# Patient Record
Sex: MALE | Race: WHITE | Employment: PART TIME | URBAN - METROPOLITAN AREA
[De-identification: names, ages, dates, MRNs, and addresses within clinical notes are randomized per-mention and may not be internally consistent; named-entity substitution may affect disease eponyms.]

---

## 2017-01-03 ENCOUNTER — OFFICE VISIT (OUTPATIENT)
Dept: ORTHOPEDICS | Facility: CLINIC | Age: 52
End: 2017-01-03
Payer: COMMERCIAL

## 2017-01-03 ENCOUNTER — RADIANT APPOINTMENT (OUTPATIENT)
Dept: GENERAL RADIOLOGY | Facility: CLINIC | Age: 52
End: 2017-01-03
Attending: ORTHOPAEDIC SURGERY
Payer: COMMERCIAL

## 2017-01-03 VITALS — TEMPERATURE: 98.2 F | HEART RATE: 95 BPM | DIASTOLIC BLOOD PRESSURE: 98 MMHG | SYSTOLIC BLOOD PRESSURE: 145 MMHG

## 2017-01-03 DIAGNOSIS — Z47.89 ORTHOPEDIC AFTERCARE: ICD-10-CM

## 2017-01-03 DIAGNOSIS — Z47.89 ORTHOPEDIC AFTERCARE: Primary | ICD-10-CM

## 2017-01-03 PROCEDURE — 99213 OFFICE O/P EST LOW 20 MIN: CPT | Performed by: ORTHOPAEDIC SURGERY

## 2017-01-03 PROCEDURE — 73562 X-RAY EXAM OF KNEE 3: CPT | Mod: RT | Performed by: RADIOLOGY

## 2017-01-03 ASSESSMENT — PAIN SCALES - GENERAL: PAINLEVEL: MODERATE PAIN (4)

## 2017-01-03 NOTE — PROGRESS NOTES
Shadi returns today for his right knee. He reports that he has been having some soreness along the lateral aspect of the knee -- the right knee is s/p TKA-- and questions if this is associated with swelling.   On physical examination the knee is benign in appearance and there is no effusion. He is not TTP at the medial or lateral joint lines. The collateral ligaments are intact as is the PCL. There is no mid-flexion instability. Knee ROM is fluid and painless and there is full extension and greater than 110 of flexion. His gait is normal. There is a small superificial prominence that is benign and compressible and seems consistent with a large superficial vein.   We reviewed the radiographs together. These show the normal progression for a total knee arthroplasty without evidence of loosening or subsidence.   Assessment: normal knee. Suspect he may have over done it a little over the holidays and as the exam is benign and his symptoms are mild and resolving it is appropriate to observe for now. He is going to return to clinic if the symptoms do not continue to improve or if not resolved in a few weeks time.     Twenty minutes were spent with the patient with greater than 50% on counseling and coordination of care.

## 2017-01-03 NOTE — NURSING NOTE
"Jeronimo Langston's goals for this visit include: RTKA 8-15-16; knee swelling, Friday - noticed a 'lump' on the right side of knee  He requests these members of his care team be copied on today's visit information: yes    PCP: Lexi Jha    Referring Provider:  No referring provider defined for this encounter.    Chief Complaint   Patient presents with     Surgical Followup     RTKA 8-15-16; knee swelling, Friday - noticed a 'lump' on the right side of knee       Initial /98 mmHg  Pulse 95  Temp(Src) 98.2  F (36.8  C) (Oral) Estimated body mass index is 32.15 kg/(m^2) as calculated from the following:    Height as of 9/6/16: 1.753 m (5' 9\").    Weight as of 8/15/16: 98.8 kg (217 lb 13 oz).  BP completed using cuff size: large    "

## 2017-01-03 NOTE — PATIENT INSTRUCTIONS
Thanks for coming today.  Ortho/Sports Medicine Clinic  [address] [place] [address]    To schedule future appointments in Ortho Clinic, you may call [phone].    To schedule ordered imaging by your Provider: Call [facility] at [phone]    Xumii available online at:   [email or web address]    Please call if any further questions or concerns [phone] and ask for the Orthopedic Department. Clinic hours 8 am to 5 pm.    Return to clinic if symptoms worsen.

## 2017-02-24 DIAGNOSIS — Z96.651 STATUS POST TOTAL RIGHT KNEE REPLACEMENT: Primary | ICD-10-CM

## 2017-02-28 ENCOUNTER — RADIANT APPOINTMENT (OUTPATIENT)
Dept: GENERAL RADIOLOGY | Facility: CLINIC | Age: 52
End: 2017-02-28
Attending: ORTHOPAEDIC SURGERY
Payer: COMMERCIAL

## 2017-02-28 ENCOUNTER — OFFICE VISIT (OUTPATIENT)
Dept: ORTHOPEDICS | Facility: CLINIC | Age: 52
End: 2017-02-28
Payer: COMMERCIAL

## 2017-02-28 VITALS — SYSTOLIC BLOOD PRESSURE: 138 MMHG | DIASTOLIC BLOOD PRESSURE: 91 MMHG | HEART RATE: 95 BPM

## 2017-02-28 DIAGNOSIS — Z96.651 STATUS POST TOTAL RIGHT KNEE REPLACEMENT: ICD-10-CM

## 2017-02-28 DIAGNOSIS — M25.561 RIGHT KNEE PAIN, UNSPECIFIED CHRONICITY: Primary | ICD-10-CM

## 2017-02-28 PROCEDURE — 73560 X-RAY EXAM OF KNEE 1 OR 2: CPT | Mod: RT | Performed by: RADIOLOGY

## 2017-02-28 PROCEDURE — 99213 OFFICE O/P EST LOW 20 MIN: CPT | Performed by: ORTHOPAEDIC SURGERY

## 2017-02-28 RX ORDER — DIAZEPAM 5 MG
TABLET ORAL
Refills: 3 | Status: ON HOLD | COMMUNITY
Start: 2017-02-21 | End: 2019-02-25

## 2017-02-28 NOTE — MR AVS SNAPSHOT
After Visit Summary   2017    Jeronimo Langston    MRN: [id number]           Patient Information     Date Of Birth          1965        Visit Information        Provider Department      2017 9:15 AM Agustín Freire MD [facility]        Today's Diagnoses     Right knee pain, unspecified chronicity    -  1      Care Instructions    Thanks for coming today.  Ortho/Sports Medicine Clinic  [address] [place] [address]    To schedule future appointments in Ortho Clinic, you may call [phone].    To schedule ordered imaging by your provider:   Call [facility] [phone]    To schedule an injection ordered by your provider:  Call [facility] Injection scheduling line: [phone]  YEDInstitute available online at:  [email or web address]    Please call if any further questions or concerns ([phone]).  Clinic hours 8 am to 5 pm.    Return to clinic (call) if symptoms worsen or fail to improve.          Follow-ups after your visit        Who to contact     If you have questions or need follow up information about today's clinic visit or your schedule please contact [facility] directly at [phone].  Normal or non-critical lab and imaging results will be communicated to you by MyChart, letter or phone within 4 business days after the clinic has received the results. If you do not hear from us within 7 days, please contact the clinic through [email or web address] or phone. If you have a critical or abnormal lab result, we will notify you by phone as soon as possible.  Submit refill requests through YEDInstitute or call your pharmacy and they will forward the refill request to us. Please allow 3 business days for your refill to be completed.          Additional Information About Your Visit        MyChart Information     YEDInstitute is an electronic gateway that provides easy, online access to your medical records. With YEDInstitute, you can  request a clinic appointment, read your test results, renew a prescription or communicate with your care team.     To sign up for Otometrix Medical Technologiest visit the website at [email or web address]   You will be asked to enter the access code listed below, as well as some personal information. Please follow the directions to create your username and password.     Your access code is: [email or web address]  Expires: 2017  9:47 AM     Your access code will  in 90 days. If you need help or a new code, please contact your [facility] or call [phone] for assistance.        Care EveryWhere ID     This is your Care EveryWhere ID. This could be used by other organizations to access your [place] medical records  [id number]        Your Vitals Were     Pulse                   95            Blood Pressure from Last 3 Encounters:   17 (!) 138/91   17 (!) 145/98   10/18/16 143/90    Weight from Last 3 Encounters:   08/15/16 98.8 kg (217 lb 13 oz)   16 102.5 kg (226 lb)   16 99.8 kg (220 lb)              Today, you had the following     No orders found for display       Primary Care Provider    None       No address on file        Thank you!     Thank you for choosing [facility]  for your care. Our goal is always to provide you with excellent care. Hearing back from our patients is one way we can continue to improve our services. Please take a few minutes to complete the written survey that you may receive in the mail after your visit with us. Thank you!             Your Updated Medication List - Protect others around you: Learn how to safely use, store and throw away your medicines at www.disposemymeds.org.          This list is accurate as of: 17  9:47 AM.  Always use your most recent med list.                   Brand Name Dispense Instructions for use    AMBIEN PO          ASPIRIN PO      Take 81 mg by mouth daily       diazepam 5 MG tablet    VALIUM      TK 1 T PO TID PRN       DULOXETINE HCL PO      Take 60 mg by mouth       gabapentin 300 MG capsule    NEURONTIN     Take 600 mg by mouth 2 times daily       magnesium 100 MG Caps          Multi-vitamin Tabs tablet   Generic drug:  multivitamin, therapeutic with minerals          OMEGA-3 FISH OIL PO      Take by mouth daily

## 2017-02-28 NOTE — PATIENT INSTRUCTIONS
Thanks for coming today.  Ortho/Sports Medicine Clinic  [address] [place] [address]    To schedule future appointments in Ortho Clinic, you may call [phone].    To schedule ordered imaging by your provider:   Call [facility] [phone]    To schedule an injection ordered by your provider:  Call [facility] Injection scheduling line: [phone]  FotoSwipehart available online at:  [email or web address]    Please call if any further questions or concerns ([phone]).  Clinic hours 8 am to 5 pm.    Return to clinic (call) if symptoms worsen or fail to improve.

## 2017-02-28 NOTE — NURSING NOTE
"Jeronimo Langston's goals for this visit include: 6mo follow up RTKA, pain when walking and doing stairs  He requests these members of his care team be copied on today's visit information: yes    PCP: None    Referring Provider:  No referring provider defined for this encounter.    Chief Complaint   Patient presents with     RECHECK     6mo RTKA       Initial BP (!) 138/91  Pulse 95 Estimated body mass index is 32.17 kg/(m^2) as calculated from the following:    Height as of 8/15/16: 1.753 m (5' 9\").    Weight as of 8/15/16: 98.8 kg (217 lb 13 oz).  Medication Reconciliation: complete   Nancy David RN      "

## 2017-02-28 NOTE — PROGRESS NOTES
Chief Complaint: RECHECK (6mo RTKA)   right knee anterior symptoms from the distal thigh across the anterior knee to proximal tibia     HPI: Jeronimo Langston returns today in follow-up for the above. He reports that he is in the process of determining if his symptoms are associated with his long standing spine issues or from the knee. He reports an area at the lateral joint line that is quite point tender and that this does not reproduce the entire pain pattern when he presses on it. He is undergoing a lumbar rhizotomy this week to further this work-up. He also reports medial thigh pain.    Medications and allergies are documented in the EMR and have been reviewed.      Current Outpatient Prescriptions:      ASPIRIN PO, Take 81 mg by mouth daily, Disp: , Rfl:      diazepam (VALIUM) 5 MG tablet, TK 1 T PO TID PRN, Disp: , Rfl: 3     DULOXETINE HCL PO, Take 60 mg by mouth , Disp: , Rfl:      magnesium 100 MG CAPS, , Disp: , Rfl:      Zolpidem Tartrate (AMBIEN PO), , Disp: , Rfl:      Omega-3 Fatty Acids (OMEGA-3 FISH OIL PO), Take by mouth daily, Disp: , Rfl:      gabapentin (NEURONTIN) 300 MG capsule, Take 600 mg by mouth 2 times daily , Disp: , Rfl:      MULTI-VITAMIN OR TABS, , Disp: , Rfl: 0    Allergies: Oxycodone; No known drug allergies; and Lovastatin    Physical Exam:  On physical examination the patient appears the stated age, is in no acute distress, affect is appropriate, and breathing is non-labored.    BP (!) 138/91  Pulse 95  There is no height or weight on file to calculate BMI.    Gait: mild limp on the right   ROM:  Knee ROM is fluid and painless with 0-115 of flexion. He is well balanced in the coronal and the sagital planes and I can't make the lateral knee hurt even with maximum varus force. He is point tender at the lateral > medial joint lines. It does not seem to reproduce his pain pattern but it is sore. The knee is benign in appearance. There is no redness and no swelling.     Distally, the  circulatory, motor, and sensation exam is intact with 5/5 EHL, gastroc-soleus, and tibialis anterior.  Sensation to light touch is intact.  Dorsalis pedis and posterior tibialis pulses are palpable.  There are no sores on the feet, no bruising, and no lymphedema.    X-rays:    I reviewed the x-rays dated today.  Previous films reviewed.    Findings:  Normal progression for a total knee arthroplasty without evidence of loosening or subsidence. He has 5 degrees of posterior slope.     Assessment: question two separate issues with lateral joint line tenderness that seems to be from his knee and we discussed a more superificial injection at this level. I have suggested, however, not to do this until after the effects of the rhizotomy are clear.     Plan: he is going to [facility] in 6 weeks for a lateral soft-tissue injection.     No ref. provider found

## 2017-04-17 ENCOUNTER — DOCUMENTATION ONLY (OUTPATIENT)
Dept: ORTHOPEDICS | Facility: CLINIC | Age: 52
End: 2017-04-17

## 2017-04-17 NOTE — PROGRESS NOTES
Date Received:  4-17-17  Form Type: [employer] statement form  Doctor:  ESTHELA Freire  Status: Working on  Special Instructions:  The atty office is also requesting a narrative report at this time as well  Colleen Seo, Admin Cooord. II, Orthopaedics    4/18/17 Per Chepe, Nurse for Dr Freire, Dr Freire stated for patient to get a FCE to complete the [employer] statement form.  Colleen Seo, Admin Cooord. II, Orthopaedics

## 2017-05-02 ENCOUNTER — OFFICE VISIT (OUTPATIENT)
Dept: ORTHOPEDICS | Facility: CLINIC | Age: 52
End: 2017-05-02
Payer: COMMERCIAL

## 2017-05-02 VITALS — SYSTOLIC BLOOD PRESSURE: 132 MMHG | OXYGEN SATURATION: 93 % | DIASTOLIC BLOOD PRESSURE: 89 MMHG | HEART RATE: 91 BPM

## 2017-05-02 DIAGNOSIS — M25.561 RIGHT KNEE PAIN, UNSPECIFIED CHRONICITY: Primary | ICD-10-CM

## 2017-05-02 LAB
CRP SERPL-MCNC: 3.4 MG/L (ref 0–8)
ERYTHROCYTE [SEDIMENTATION RATE] IN BLOOD BY WESTERGREN METHOD: 6 MM/H (ref 0–20)

## 2017-05-02 PROCEDURE — 86140 C-REACTIVE PROTEIN: CPT | Performed by: ORTHOPAEDIC SURGERY

## 2017-05-02 PROCEDURE — 99213 OFFICE O/P EST LOW 20 MIN: CPT | Performed by: ORTHOPAEDIC SURGERY

## 2017-05-02 PROCEDURE — 85652 RBC SED RATE AUTOMATED: CPT | Performed by: ORTHOPAEDIC SURGERY

## 2017-05-02 PROCEDURE — 36415 COLL VENOUS BLD VENIPUNCTURE: CPT | Performed by: ORTHOPAEDIC SURGERY

## 2017-05-02 ASSESSMENT — PAIN SCALES - GENERAL: PAINLEVEL: SEVERE PAIN (7)

## 2017-05-02 NOTE — PATIENT INSTRUCTIONS
Thanks for coming today.  Ortho/Sports Medicine Clinic  [address] [place] [address]    To schedule future appointments in Ortho Clinic, you may call [phone].    To schedule ordered imaging by your provider:   Call [facility] [phone]    To schedule an injection ordered by your provider:  Call [facility] Injection scheduling line: [phone]  Icelandic Glacialhart available online at:  [email or web address]    Please call if any further questions or concerns ([phone]).  Clinic hours 8 am to 5 pm.    Return to clinic (call) if symptoms worsen or fail to improve.

## 2017-05-02 NOTE — MR AVS SNAPSHOT
After Visit Summary   2017    Jeronimo Langston    MRN: [id number]           Patient Information     Date Of Birth          1965        Visit Information        Provider Department      2017 9:10 AM Agustín Freire MD [facility]        Today's Diagnoses     Right knee pain, unspecified chronicity    -  1      Care Instructions    Thanks for coming today.  Ortho/Sports Medicine Clinic  [address] [place] [address]    To schedule future appointments in Ortho Clinic, you may call [phone].    To schedule ordered imaging by your provider:   Call [facility] [phone]    To schedule an injection ordered by your provider:  Call [facility] Injection scheduling line: [phone]  Ozmota available online at:  [email or web address]    Please call if any further questions or concerns ([phone]).  Clinic hours 8 am to 5 pm.    Return to clinic (call) if symptoms worsen or fail to improve.        Follow-ups after your visit        Who to contact     If you have questions or need follow up information about today's clinic visit or your schedule please contact [facility] directly at [phone].  Normal or non-critical lab and imaging results will be communicated to you by MyChart, letter or phone within 4 business days after the clinic has received the results. If you do not hear from us within 7 days, please contact the clinic through Triggithart or phone. If you have a critical or abnormal lab result, we will notify you by phone as soon as possible.  Submit refill requests through Ozmota or call your pharmacy and they will forward the refill request to us. Please allow 3 business days for your refill to be completed.          Additional Information About Your Visit        MyChart Information     Ozmota is an electronic gateway that provides easy, online access to your medical records. With Ozmota, you can  request a clinic appointment, read your test results, renew a prescription or communicate with your care team.     To sign up for xPeerientt visit the website at [email or web address]   You will be asked to enter the access code listed below, as well as some personal information. Please follow the directions to create your username and password.     Your access code is: [email or web address]  Expires: 2017 10:47 AM     Your access code will  in 90 days. If you need help or a new code, please contact your [facility] or call [phone] for assistance.        Care EveryWhere ID     This is your Care EveryWhere ID. This could be used by other organizations to access your [place] medical records  [id number]        Your Vitals Were     Pulse Pulse Oximetry                91 93%           Blood Pressure from Last 3 Encounters:   17 132/89   17 (!) 138/91   17 (!) 145/98    Weight from Last 3 Encounters:   08/15/16 98.8 kg (217 lb 13 oz)   16 102.5 kg (226 lb)   16 99.8 kg (220 lb)              We Performed the Following     CRP inflammation     Erythrocyte sedimentation rate auto        Primary Care Provider    None       No address on file        Thank you!     Thank you for choosing [facility]  for your care. Our goal is always to provide you with excellent care. Hearing back from our patients is one way we can continue to improve our services. Please take a few minutes to complete the written survey that you may receive in the mail after your visit with us. Thank you!             Your Updated Medication List - Protect others around you: Learn how to safely use, store and throw away your medicines at www.disposemymeds.org.          This list is accurate as of: 17  9:26 AM.  Always use your most recent med list.                   Brand Name Dispense Instructions for use    AMBIEN PO          ASPIRIN PO      Take 81 mg by mouth  daily       diazepam 5 MG tablet    VALIUM     TK 1 T PO TID PRN       DULOXETINE HCL PO      Take 60 mg by mouth       gabapentin 300 MG capsule    NEURONTIN     Take 600 mg by mouth 2 times daily       magnesium 100 MG Caps          Multi-vitamin Tabs tablet   Generic drug:  multivitamin, therapeutic with minerals          OMEGA-3 FISH OIL PO      Take by mouth daily

## 2017-05-02 NOTE — PROGRESS NOTES
Jeronimo returns today for his knee and his back. He had the rhizotomy and unfortunately this made his symptoms significantly worse and he is in the process of having a epidural scheduled to address this.     On physical examination he walks with a very mild antalgic gait favoring his right. The knee examination is unchanged.     Assessment and Plan: what appears to be symptoms secondary to point tenderness just distal to the lateral joint line. I have suggested that we get a SED/CRP to make sure its not a lingering infection and then injection that area. Spine care per his outside spine team.

## 2017-05-16 ENCOUNTER — OFFICE VISIT (OUTPATIENT)
Dept: ORTHOPEDICS | Facility: CLINIC | Age: 52
End: 2017-05-16
Payer: COMMERCIAL

## 2017-05-16 VITALS — DIASTOLIC BLOOD PRESSURE: 89 MMHG | SYSTOLIC BLOOD PRESSURE: 145 MMHG | HEART RATE: 92 BPM

## 2017-05-16 DIAGNOSIS — M25.561 CHRONIC PAIN OF RIGHT KNEE: Primary | ICD-10-CM

## 2017-05-16 DIAGNOSIS — G89.29 CHRONIC PAIN OF RIGHT KNEE: Primary | ICD-10-CM

## 2017-05-16 PROCEDURE — 20610 DRAIN/INJ JOINT/BURSA W/O US: CPT | Mod: RT | Performed by: ORTHOPAEDIC SURGERY

## 2017-05-16 PROCEDURE — 99207 ZZC NO CHARGE LOS: CPT | Performed by: ORTHOPAEDIC SURGERY

## 2017-05-16 NOTE — PATIENT INSTRUCTIONS
Thanks for coming today.  Ortho/Sports Medicine Clinic  [address] [place] [address]    To schedule future appointments in Ortho Clinic, you may call [phone].    To schedule ordered imaging by your provider:   Call [facility] [phone]    To schedule an injection ordered by your provider:  Call [facility] Injection scheduling line: [phone]  Mind The Placehart available online at:  [email or web address]    Please call if any further questions or concerns ([phone]).  Clinic hours 8 am to 5 pm.    Return to clinic (call) if symptoms worsen or fail to improve.

## 2017-05-16 NOTE — PROGRESS NOTES
Jeronimo returns today for his right knee. He reports there has been no change in his symptoms. We reviewed his lab results. These are normal and show no concern for infection.      On examination the knee is benign in appearance. There is no swelling, redness, or induration and extension is full and comfortable.    Procedure Note:    After informed consent was obtained the patient's right knee was injected with 5 cc of lidocaine and 40 mg of kenolog using a superior-medial approach.  Sterile technique was used.  The patient tolerated the procedure well.

## 2017-05-16 NOTE — NURSING NOTE
"Jeronimo Langston's goals for this visit include: Right knee injection  He requests these members of his care team be copied on today's visit information: yes    PCP: None    Referring Provider:  No referring provider defined for this encounter.    Chief Complaint   Patient presents with     RECHECK     Right knee injection/ lab results       Initial /89  Pulse 92 Estimated body mass index is 32.17 kg/(m^2) as calculated from the following:    Height as of 8/15/16: 1.753 m (5' 9\").    Weight as of 8/15/16: 98.8 kg (217 lb 13 oz).  Medication Reconciliation: complete  "

## 2017-05-16 NOTE — MR AVS SNAPSHOT
After Visit Summary   2017    Jeronimo Langston    MRN: [id number]           Patient Information     Date Of Birth          1965        Visit Information        Provider Department      2017 10:10 AM Agustín Freire MD [facility]        Today's Diagnoses     Chronic pain of right knee    -  1      Care Instructions    Thanks for coming today.  Ortho/Sports Medicine Clinic  [address] [place] [address]    To schedule future appointments in Ortho Clinic, you may call [phone].    To schedule ordered imaging by your provider:   Call [facility] [phone]    To schedule an injection ordered by your provider:  Call [facility] Injection scheduling line: [phone]  Vandas Group available online at:  [email or web address]    Please call if any further questions or concerns ([phone]).  Clinic hours 8 am to 5 pm.    Return to clinic (call) if symptoms worsen or fail to improve.        Follow-ups after your visit        Who to contact     If you have questions or need follow up information about today's clinic visit or your schedule please contact [facility] directly at [phone].  Normal or non-critical lab and imaging results will be communicated to you by SenSagehart, letter or phone within 4 business days after the clinic has received the results. If you do not hear from us within 7 days, please contact the clinic through SenSagehart or phone. If you have a critical or abnormal lab result, we will notify you by phone as soon as possible.  Submit refill requests through Vandas Group or call your pharmacy and they will forward the refill request to us. Please allow 3 business days for your refill to be completed.          Additional Information About Your Visit        MyCharDirect Flow Medical Information     Vandas Group is an electronic gateway that provides easy, online access to your medical records. With Vandas Group, you can request a clinic  appointment, read your test results, renew a prescription or communicate with your care team.     To sign up for ArtCorgihart visit the website at [email or web address]   You will be asked to enter the access code listed below, as well as some personal information. Please follow the directions to create your username and password.     Your access code is: [email or web address]  Expires: 2017 10:47 AM     Your access code will  in 90 days. If you need help or a new code, please contact your [facility] or call [phone] for assistance.        Care EveryWhere ID     This is your Care EveryWhere ID. This could be used by other organizations to access your [place] medical records  [id number]        Your Vitals Were     Pulse                   92            Blood Pressure from Last 3 Encounters:   17 145/89   17 132/89   17 (!) 138/91    Weight from Last 3 Encounters:   08/15/16 98.8 kg (217 lb 13 oz)   16 102.5 kg (226 lb)   16 99.8 kg (220 lb)              Today, you had the following     No orders found for display       Primary Care Provider    None       No address on file        Thank you!     Thank you for choosing [facility]  for your care. Our goal is always to provide you with excellent care. Hearing back from our patients is one way we can continue to improve our services. Please take a few minutes to complete the written survey that you may receive in the mail after your visit with us. Thank you!             Your Updated Medication List - Protect others around you: Learn how to safely use, store and throw away your medicines at www.disposemymeds.org.          This list is accurate as of: 17 10:44 AM.  Always use your most recent med list.                   Brand Name Dispense Instructions for use    AMBIEN PO          ASPIRIN PO      Take 81 mg by mouth daily       diazepam 5 MG tablet    VALIUM     TK 1 T PO TID PRN        DULOXETINE HCL PO      Take 60 mg by mouth       gabapentin 300 MG capsule    NEURONTIN     Take 600 mg by mouth 2 times daily       magnesium 100 MG Caps          Multi-vitamin Tabs tablet   Generic drug:  multivitamin, therapeutic with minerals          OMEGA-3 FISH OIL PO      Take by mouth daily

## 2017-12-12 ENCOUNTER — RADIANT APPOINTMENT (OUTPATIENT)
Dept: GENERAL RADIOLOGY | Facility: CLINIC | Age: 52
End: 2017-12-12
Attending: ORTHOPAEDIC SURGERY
Payer: COMMERCIAL

## 2017-12-12 ENCOUNTER — OFFICE VISIT (OUTPATIENT)
Dept: ORTHOPEDICS | Facility: CLINIC | Age: 52
End: 2017-12-12
Payer: COMMERCIAL

## 2017-12-12 VITALS
WEIGHT: 220 LBS | DIASTOLIC BLOOD PRESSURE: 96 MMHG | SYSTOLIC BLOOD PRESSURE: 149 MMHG | OXYGEN SATURATION: 96 % | HEART RATE: 103 BPM | BODY MASS INDEX: 32.49 KG/M2

## 2017-12-12 DIAGNOSIS — G89.29 CHRONIC PAIN OF RIGHT KNEE: ICD-10-CM

## 2017-12-12 DIAGNOSIS — M25.561 CHRONIC PAIN OF RIGHT KNEE: ICD-10-CM

## 2017-12-12 DIAGNOSIS — G89.29 CHRONIC PAIN OF RIGHT KNEE: Primary | ICD-10-CM

## 2017-12-12 DIAGNOSIS — M25.561 CHRONIC PAIN OF RIGHT KNEE: Primary | ICD-10-CM

## 2017-12-12 PROCEDURE — 99213 OFFICE O/P EST LOW 20 MIN: CPT | Performed by: ORTHOPAEDIC SURGERY

## 2017-12-12 PROCEDURE — 73562 X-RAY EXAM OF KNEE 3: CPT | Mod: RT | Performed by: RADIOLOGY

## 2017-12-12 ASSESSMENT — PAIN SCALES - GENERAL: PAINLEVEL: MODERATE PAIN (4)

## 2017-12-12 NOTE — NURSING NOTE
"Jeronimo Langston's goals for this visit include:   He requests these members of his care team be copied on today's visit information:     PCP: Clinic, [facility]    Referring Provider:  No referring provider defined for this encounter.    Chief Complaint   Patient presents with     RECHECK     right knee pain, actuaaly states both knees hurting       Initial BP (!) 149/96 (Patient Position: Sitting, Cuff Size: Adult Large)  Pulse 103  Wt 99.8 kg (220 lb)  SpO2 96%  BMI 32.49 kg/m2 Estimated body mass index is 32.49 kg/(m^2) as calculated from the following:    Height as of 9/6/16: 1.753 m (5' 9\").    Weight as of this encounter: 99.8 kg (220 lb).  Medication Reconciliation: complete    Do you need any medication refills at today's visit? No  Chief Complaint   Patient presents with     RECHECK     right knee pain, actuaaly states both knees hurting         "

## 2017-12-12 NOTE — MR AVS SNAPSHOT
After Visit Summary   12/12/2017    Jeronimo Langston    MRN: [id number]           Patient Information     Date Of Birth          1965        Visit Information        Provider Department      12/12/2017 10:20 AM Agustín Freire MD [facility] [place]        Today's Diagnoses     Chronic pain of right knee    -  1       Follow-ups after your visit        Additional Services     [facility] PT, HAND, AND CHIROPRACTIC REFERRAL       **This order will print in the [facility] Scheduling Office**    Physical Therapy, Hand Therapy and Chiropractic Care are available through:    *[facility]  *[facility]  *[facility]    Call one number to schedule at any of the above locations: [phone].    Your provider has referred you to: Physical Therapy at [facility] or [facility]    Indication/Reason for Referral: Right knee patellar tendonitis  Onset of Illness: NA  Therapy Orders: Evaluate and Treat right knee patellar tendonitis   Special Programs: None  Special Request: None    Jaswant Michaud      Additional Comments for the Therapist or Chiropractor: NA    Please be aware that coverage of these services is subject to the terms and limitations of your health insurance plan.  Call member services at your health plan with any benefit or coverage questions.      Please bring the following to your appointment:    *Your personal calendar for scheduling future appointments  *Comfortable clothing                  Who to contact     If you have questions or need follow up information about today's clinic visit or your schedule please contact [facility] directly at [phone].  Normal or non-critical lab and imaging results will be communicated to you by MyChart, letter or phone within 4 business days after the clinic has received the results. If you do not hear from us within 7 days, please contact the clinic through MyChart or phone. If you have a  critical or abnormal lab result, we will notify you by phone as soon as possible.  Submit refill requests through [employer] or call your pharmacy and they will forward the refill request to us. Please allow 3 business days for your refill to be completed.          Additional Information About Your Visit        [employer] Information     [employer] is an electronic gateway that provides easy, online access to your medical records. With [employer], you can request a clinic appointment, read your test results, renew a prescription or communicate with your care team.     To sign up for [employer] visit the website at [email or web address]   You will be asked to enter the access code listed below, as well as some personal information. Please follow the directions to create your username and password.     Your access code is: [email or web address]  Expires: 3/17/2018  8:29 AM     Your access code will  in 90 days. If you need help or a new code, please contact your [facility] or call [phone] for assistance.        Care EveryWhere ID     This is your Care EveryWhere ID. This could be used by other organizations to access your [facility] medical records  [id number]        Your Vitals Were     Pulse Pulse Oximetry BMI (Body Mass Index)             103 96% 32.49 kg/m2          Blood Pressure from Last 3 Encounters:   17 (!) 149/96   17 145/89   17 132/89    Weight from Last 3 Encounters:   17 99.8 kg (220 lb)   08/15/16 98.8 kg (217 lb 13 oz)   16 102.5 kg (226 lb)              We Performed the Following     [facility] PT, HAND, AND CHIROPRACTIC REFERRAL        Primary Care Provider Office Phone # Fax #    [facility] [phone]       [address]  [facility] [address]        Equal Access to Services     YAW HAMMONDS : Reyna allen Soleeanna, waaxda luqadaha, qaybta kaalmachandler guillermo, luis naylor.  So [facility] [phone].    ATENCIÓN: Si habla alea, tiene a ramesh disposición servicios gratuitos de asistencia lingüística. Katya horton [phone].    We comply with applicable federal civil rights laws and [place] laws. We do not discriminate on the basis of race, color, national origin, age, disability, sex, sexual orientation, or gender identity.            Thank you!     Thank you for choosing [facility]  for your care. Our goal is always to provide you with excellent care. Hearing back from our patients is one way we can continue to improve our services. Please take a few minutes to complete the written survey that you may receive in the mail after your visit with us. Thank you!             Your Updated Medication List - Protect others around you: Learn how to safely use, store and throw away your medicines at www.disposemymeds.org.          This list is accurate as of: 12/12/17 11:59 PM.  Always use your most recent med list.                   Brand Name Dispense Instructions for use Diagnosis    AMBIEN PO           ASPIRIN PO      Take 81 mg by mouth daily        diazepam 5 MG tablet    VALIUM     TK 1 T PO TID PRN        DULOXETINE HCL PO      Take 60 mg by mouth        gabapentin 300 MG capsule    NEURONTIN     Take 600 mg by mouth 3 times daily        magnesium 100 MG Caps           Multi-vitamin Tabs tablet   Generic drug:  multivitamin, therapeutic with minerals           OMEGA-3 FISH OIL PO      Take by mouth daily

## 2017-12-12 NOTE — LETTER
12/12/2017      RE: Jeronimo Langston  [address] [address]       Chief Complaint: RECHECK (right knee pain, actuaaly states both knees hurting)       HPI: Jeronimo Langston returns today in follow-up for his knees. He reports similar symptoms on both sides: symptoms at the superior and inferior poles of the patella.      Medications and allergies are documented in the EMR and have been reviewed.    Current Outpatient Prescriptions:      ASPIRIN PO, Take 81 mg by mouth daily, Disp: , Rfl:      DULOXETINE HCL PO, Take 60 mg by mouth , Disp: , Rfl:      magnesium 100 MG CAPS, , Disp: , Rfl:      Zolpidem Tartrate (AMBIEN PO), , Disp: , Rfl:      Omega-3 Fatty Acids (OMEGA-3 FISH OIL PO), Take by mouth daily, Disp: , Rfl:      gabapentin (NEURONTIN) 300 MG capsule, Take 600 mg by mouth 3 times daily , Disp: , Rfl:      MULTI-VITAMIN OR TABS, , Disp: , Rfl: 0     diazepam (VALIUM) 5 MG tablet, TK 1 T PO TID PRN, Disp: , Rfl: 3  Allergies: Oxycodone; No known drug allergies; and Lovastatin    Physical Exam:  On physical examination the patient appears the stated age, is in no acute distress, affect is appropriate, and breathing is non-labored.  BP (!) 149/96 (Patient Position: Sitting, Cuff Size: Adult Large)  Pulse 103  Wt 99.8 kg (220 lb)  SpO2 96%  BMI 32.49 kg/m2  Body mass index is 32.49 kg/(m^2).  Gait: no limp  ROM of his arthroplasty is excellent with 0-115 and the knee is well balanced in the coronal plane and there is no midflexion instability. Knee symptoms are reproduced with palpation at the quads and patellar tendon insertions to the patella  Distally, the circulatory, motor, and sensation exam is intact with 5/5 EHL, gastroc-soleus, and tibialis anterior.  Sensation to light touch is intact.  Dorsalis pedis and posterior tibialis pulses are palpable.  There are no sores on the feet, no bruising, and no lymphedema.    X-rays:    I reviewed the x-rays dated today.  Previous  films reviewed.    Findings:  Normal progression for a total knee arthroplasty without evidence of loosening or subsidence.    Assessment: quads tendonitis greater than paterall tendonitis  Plan: physical therapy program for the above, followup as scheduled, sooner if issues.      No ref. provider found      Agusítn Freire MD

## 2017-12-12 NOTE — LETTER
12/12/2017      RE: Jeronimo Langston  [address] [address]     Dear Colleague,    Thank you for referring your patient, Jeronimo Langston, to the [facility]. Please see a copy of my visit note below.    No notes on file    Again, thank you for allowing me to participate in the care of your patient.      Sincerely,    Agustín Freire MD

## 2017-12-17 NOTE — PROGRESS NOTES
Chief Complaint: RECHECK (right knee pain, actuaaly states both knees hurting)       HPI: Jeronimo Langston returns today in follow-up for his knees. He reports similar symptoms on both sides: symptoms at the superior and inferior poles of the patella.      Medications and allergies are documented in the EMR and have been reviewed.    Current Outpatient Prescriptions:      ASPIRIN PO, Take 81 mg by mouth daily, Disp: , Rfl:      DULOXETINE HCL PO, Take 60 mg by mouth , Disp: , Rfl:      magnesium 100 MG CAPS, , Disp: , Rfl:      Zolpidem Tartrate (AMBIEN PO), , Disp: , Rfl:      Omega-3 Fatty Acids (OMEGA-3 FISH OIL PO), Take by mouth daily, Disp: , Rfl:      gabapentin (NEURONTIN) 300 MG capsule, Take 600 mg by mouth 3 times daily , Disp: , Rfl:      MULTI-VITAMIN OR TABS, , Disp: , Rfl: 0     diazepam (VALIUM) 5 MG tablet, TK 1 T PO TID PRN, Disp: , Rfl: 3  Allergies: Oxycodone; No known drug allergies; and Lovastatin    Physical Exam:  On physical examination the patient appears the stated age, is in no acute distress, affect is appropriate, and breathing is non-labored.  BP (!) 149/96 (Patient Position: Sitting, Cuff Size: Adult Large)  Pulse 103  Wt 99.8 kg (220 lb)  SpO2 96%  BMI 32.49 kg/m2  Body mass index is 32.49 kg/(m^2).  Gait: no limp  ROM of his arthroplasty is excellent with 0-115 and the knee is well balanced in the coronal plane and there is no midflexion instability. Knee symptoms are reproduced with palpation at the quads and patellar tendon insertions to the patella  Distally, the circulatory, motor, and sensation exam is intact with 5/5 EHL, gastroc-soleus, and tibialis anterior.  Sensation to light touch is intact.  Dorsalis pedis and posterior tibialis pulses are palpable.  There are no sores on the feet, no bruising, and no lymphedema.    X-rays:    I reviewed the x-rays dated today.  Previous films reviewed.    Findings:  Normal progression for a total knee arthroplasty without evidence  of loosening or subsidence.    Assessment: quads tendonitis greater than paterall tendonitis  Plan: physical therapy program for the above, followup as scheduled, sooner if issues.      No ref. provider found

## 2019-02-22 ENCOUNTER — ANESTHESIA EVENT (OUTPATIENT)
Dept: SURGERY | Facility: CLINIC | Age: 54
End: 2019-02-22
Payer: COMMERCIAL

## 2019-02-22 RX ORDER — GABAPENTIN 600 MG/1
600 TABLET ORAL 4 TIMES DAILY
Status: ON HOLD | COMMUNITY
End: 2019-02-25

## 2019-02-22 RX ORDER — GLIPIZIDE AND METFORMIN HCL 5; 500 MG/1; MG/1
1 TABLET, FILM COATED ORAL
COMMUNITY

## 2019-02-22 RX ORDER — ZOLPIDEM TARTRATE 10 MG/1
10 TABLET ORAL EVERY EVENING
COMMUNITY

## 2019-02-22 RX ORDER — DULOXETIN HYDROCHLORIDE 60 MG/1
60 CAPSULE, DELAYED RELEASE ORAL EVERY EVENING
COMMUNITY

## 2019-02-22 RX ORDER — MULTIPLE VITAMINS W/ MINERALS TAB 9MG-400MCG
1 TAB ORAL DAILY
COMMUNITY

## 2019-02-25 ENCOUNTER — ANESTHESIA (OUTPATIENT)
Dept: SURGERY | Facility: CLINIC | Age: 54
End: 2019-02-25
Payer: COMMERCIAL

## 2019-02-25 ENCOUNTER — APPOINTMENT (OUTPATIENT)
Dept: PHYSICAL THERAPY | Facility: CLINIC | Age: 54
End: 2019-02-25
Attending: ORTHOPAEDIC SURGERY
Payer: COMMERCIAL

## 2019-02-25 ENCOUNTER — HOSPITAL ENCOUNTER (INPATIENT)
Facility: CLINIC | Age: 54
LOS: 2 days | Discharge: HOME | End: 2019-02-27
Attending: ORTHOPAEDIC SURGERY | Admitting: ORTHOPAEDIC SURGERY
Payer: COMMERCIAL

## 2019-02-25 ENCOUNTER — APPOINTMENT (OUTPATIENT)
Dept: GENERAL RADIOLOGY | Facility: CLINIC | Age: 54
End: 2019-02-25
Attending: ORTHOPAEDIC SURGERY
Payer: COMMERCIAL

## 2019-02-25 DIAGNOSIS — Z96.651 STATUS POST TOTAL RIGHT KNEE REPLACEMENT: Primary | ICD-10-CM

## 2019-02-25 PROBLEM — Z96.659 S/P TOTAL KNEE ARTHROPLASTY: Status: ACTIVE | Noted: 2019-02-25

## 2019-02-25 LAB
ANION GAP SERPL CALCULATED.3IONS-SCNC: 5 MMOL/L (ref 3–14)
BUN SERPL-MCNC: 21 MG/DL (ref 7–30)
CALCIUM SERPL-MCNC: 8.8 MG/DL (ref 8.5–10.1)
CHLORIDE SERPL-SCNC: 108 MMOL/L (ref 94–109)
CO2 SERPL-SCNC: 26 MMOL/L (ref 20–32)
CREAT SERPL-MCNC: 0.71 MG/DL (ref 0.66–1.25)
CREAT SERPL-MCNC: 0.81 MG/DL (ref 0.66–1.25)
GFR SERPL CREATININE-BSD FRML MDRD: >90 ML/MIN/{1.73_M2}
GFR SERPL CREATININE-BSD FRML MDRD: >90 ML/MIN/{1.73_M2}
GLUCOSE BLDC GLUCOMTR-MCNC: 140 MG/DL (ref 70–99)
GLUCOSE BLDC GLUCOMTR-MCNC: 146 MG/DL (ref 70–99)
GLUCOSE BLDC GLUCOMTR-MCNC: 162 MG/DL (ref 70–99)
GLUCOSE BLDC GLUCOMTR-MCNC: 181 MG/DL (ref 70–99)
GLUCOSE SERPL-MCNC: 156 MG/DL (ref 70–99)
HGB BLD-MCNC: 14.1 G/DL (ref 13.3–17.7)
PLATELET # BLD AUTO: 177 10E9/L (ref 150–450)
POTASSIUM SERPL-SCNC: 4.6 MMOL/L (ref 3.4–5.3)
SODIUM SERPL-SCNC: 139 MMOL/L (ref 133–144)

## 2019-02-25 PROCEDURE — 36000093 ZZH SURGERY LEVEL 4 1ST 30 MIN: Performed by: ORTHOPAEDIC SURGERY

## 2019-02-25 PROCEDURE — 25000566 ZZH SEVOFLURANE, EA 15 MIN: Performed by: ORTHOPAEDIC SURGERY

## 2019-02-25 PROCEDURE — 25000128 H RX IP 250 OP 636: Performed by: ANESTHESIOLOGY

## 2019-02-25 PROCEDURE — 85049 AUTOMATED PLATELET COUNT: CPT | Performed by: ORTHOPAEDIC SURGERY

## 2019-02-25 PROCEDURE — 25800030 ZZH RX IP 258 OP 636: Performed by: ANESTHESIOLOGY

## 2019-02-25 PROCEDURE — 27210794 ZZH OR GENERAL SUPPLY STERILE: Performed by: ORTHOPAEDIC SURGERY

## 2019-02-25 PROCEDURE — 25000132 ZZH RX MED GY IP 250 OP 250 PS 637: Performed by: ORTHOPAEDIC SURGERY

## 2019-02-25 PROCEDURE — 25000132 ZZH RX MED GY IP 250 OP 250 PS 637: Performed by: ANESTHESIOLOGY

## 2019-02-25 PROCEDURE — 25800030 ZZH RX IP 258 OP 636: Performed by: ORTHOPAEDIC SURGERY

## 2019-02-25 PROCEDURE — 36000063 ZZH SURGERY LEVEL 4 EA 15 ADDTL MIN: Performed by: ORTHOPAEDIC SURGERY

## 2019-02-25 PROCEDURE — 36415 COLL VENOUS BLD VENIPUNCTURE: CPT | Performed by: ORTHOPAEDIC SURGERY

## 2019-02-25 PROCEDURE — 25000132 ZZH RX MED GY IP 250 OP 250 PS 637: Performed by: NURSE ANESTHETIST, CERTIFIED REGISTERED

## 2019-02-25 PROCEDURE — 40000986 XR KNEE PORT LT 1/2 VW: Mod: LT

## 2019-02-25 PROCEDURE — 37000008 ZZH ANESTHESIA TECHNICAL FEE, 1ST 30 MIN: Performed by: ORTHOPAEDIC SURGERY

## 2019-02-25 PROCEDURE — 97110 THERAPEUTIC EXERCISES: CPT | Mod: GP

## 2019-02-25 PROCEDURE — 82565 ASSAY OF CREATININE: CPT | Performed by: ORTHOPAEDIC SURGERY

## 2019-02-25 PROCEDURE — 25800025 ZZH RX 258: Performed by: ORTHOPAEDIC SURGERY

## 2019-02-25 PROCEDURE — 25000125 ZZHC RX 250: Performed by: ORTHOPAEDIC SURGERY

## 2019-02-25 PROCEDURE — 97116 GAIT TRAINING THERAPY: CPT | Mod: GP

## 2019-02-25 PROCEDURE — 99207 ZZC CONSULT E&M CHANGED TO INITIAL LEVEL: CPT | Performed by: NURSE PRACTITIONER

## 2019-02-25 PROCEDURE — 85018 HEMOGLOBIN: CPT | Performed by: ORTHOPAEDIC SURGERY

## 2019-02-25 PROCEDURE — 12000000 ZZH R&B MED SURG/OB

## 2019-02-25 PROCEDURE — 71000012 ZZH RECOVERY PHASE 1 LEVEL 1 FIRST HR: Performed by: ORTHOPAEDIC SURGERY

## 2019-02-25 PROCEDURE — 27810169 ZZH OR IMPLANT GENERAL: Performed by: ORTHOPAEDIC SURGERY

## 2019-02-25 PROCEDURE — 0SRD0J9 REPLACEMENT OF LEFT KNEE JOINT WITH SYNTHETIC SUBSTITUTE, CEMENTED, OPEN APPROACH: ICD-10-PCS | Performed by: ORTHOPAEDIC SURGERY

## 2019-02-25 PROCEDURE — 25000128 H RX IP 250 OP 636: Performed by: ORTHOPAEDIC SURGERY

## 2019-02-25 PROCEDURE — 25000125 ZZHC RX 250: Performed by: NURSE ANESTHETIST, CERTIFIED REGISTERED

## 2019-02-25 PROCEDURE — 40000170 ZZH STATISTIC PRE-PROCEDURE ASSESSMENT II: Performed by: ORTHOPAEDIC SURGERY

## 2019-02-25 PROCEDURE — 25800030 ZZH RX IP 258 OP 636: Performed by: NURSE ANESTHETIST, CERTIFIED REGISTERED

## 2019-02-25 PROCEDURE — 80048 BASIC METABOLIC PNL TOTAL CA: CPT | Performed by: ORTHOPAEDIC SURGERY

## 2019-02-25 PROCEDURE — 25000132 ZZH RX MED GY IP 250 OP 250 PS 637: Performed by: NURSE PRACTITIONER

## 2019-02-25 PROCEDURE — C1776 JOINT DEVICE (IMPLANTABLE): HCPCS | Performed by: ORTHOPAEDIC SURGERY

## 2019-02-25 PROCEDURE — 99222 1ST HOSP IP/OBS MODERATE 55: CPT | Performed by: NURSE PRACTITIONER

## 2019-02-25 PROCEDURE — 37000009 ZZH ANESTHESIA TECHNICAL FEE, EACH ADDTL 15 MIN: Performed by: ORTHOPAEDIC SURGERY

## 2019-02-25 PROCEDURE — 25000125 ZZHC RX 250: Performed by: ANESTHESIOLOGY

## 2019-02-25 PROCEDURE — 97530 THERAPEUTIC ACTIVITIES: CPT | Mod: GP

## 2019-02-25 PROCEDURE — 27110028 ZZH OR GENERAL SUPPLY NON-STERILE: Performed by: ORTHOPAEDIC SURGERY

## 2019-02-25 PROCEDURE — 25000128 H RX IP 250 OP 636: Performed by: NURSE ANESTHETIST, CERTIFIED REGISTERED

## 2019-02-25 PROCEDURE — 00000146 ZZHCL STATISTIC GLUCOSE BY METER IP

## 2019-02-25 PROCEDURE — 97161 PT EVAL LOW COMPLEX 20 MIN: CPT | Mod: GP

## 2019-02-25 DEVICE — IMP BASEPLATE TIBIAL GENESIS II SZ 5 LT TI 71420168: Type: IMPLANTABLE DEVICE | Site: KNEE | Status: FUNCTIONAL

## 2019-02-25 DEVICE — BONE CEMENT RADIOPAQUE SIMPLEX HV FULL DOSE 6194-1-001: Type: IMPLANTABLE DEVICE | Site: KNEE | Status: FUNCTIONAL

## 2019-02-25 DEVICE — IMP COMP PATELLA SNR GENESIS II 9X32MM 71420576: Type: IMPLANTABLE DEVICE | Site: KNEE | Status: FUNCTIONAL

## 2019-02-25 DEVICE — IMP COMP FEMORAL NP CR SZ 6 LT 71423206: Type: IMPLANTABLE DEVICE | Site: KNEE | Status: FUNCTIONAL

## 2019-02-25 RX ORDER — GLIPIZIDE 5 MG/1
5 TABLET ORAL
Status: DISCONTINUED | OUTPATIENT
Start: 2019-02-25 | End: 2019-02-25

## 2019-02-25 RX ORDER — GABAPENTIN 600 MG/1
600 TABLET ORAL 4 TIMES DAILY
Status: DISCONTINUED | OUTPATIENT
Start: 2019-02-25 | End: 2019-02-27 | Stop reason: HOSPADM

## 2019-02-25 RX ORDER — PROPOFOL 10 MG/ML
INJECTION, EMULSION INTRAVENOUS PRN
Status: DISCONTINUED | OUTPATIENT
Start: 2019-02-25 | End: 2019-02-25

## 2019-02-25 RX ORDER — MULTIVIT-MIN/IRON/FOLIC ACID/K 18-600-40
500 CAPSULE ORAL DAILY
COMMUNITY

## 2019-02-25 RX ORDER — DIPHENHYDRAMINE HCL 25 MG
25 CAPSULE ORAL EVERY 6 HOURS PRN
Status: DISCONTINUED | OUTPATIENT
Start: 2019-02-25 | End: 2019-02-25

## 2019-02-25 RX ORDER — ONDANSETRON 2 MG/ML
4 INJECTION INTRAMUSCULAR; INTRAVENOUS EVERY 30 MIN PRN
Status: DISCONTINUED | OUTPATIENT
Start: 2019-02-25 | End: 2019-02-25 | Stop reason: HOSPADM

## 2019-02-25 RX ORDER — FENTANYL CITRATE 50 UG/ML
25-50 INJECTION, SOLUTION INTRAMUSCULAR; INTRAVENOUS
Status: DISCONTINUED | OUTPATIENT
Start: 2019-02-25 | End: 2019-02-25 | Stop reason: HOSPADM

## 2019-02-25 RX ORDER — CEFAZOLIN SODIUM 2 G/100ML
2 INJECTION, SOLUTION INTRAVENOUS
Status: COMPLETED | OUTPATIENT
Start: 2019-02-25 | End: 2019-02-25

## 2019-02-25 RX ORDER — METHOCARBAMOL 750 MG/1
750 TABLET, FILM COATED ORAL 4 TIMES DAILY PRN
Status: DISCONTINUED | OUTPATIENT
Start: 2019-02-25 | End: 2019-02-27 | Stop reason: HOSPADM

## 2019-02-25 RX ORDER — HYDROMORPHONE HYDROCHLORIDE 1 MG/ML
.3-.5 INJECTION, SOLUTION INTRAMUSCULAR; INTRAVENOUS; SUBCUTANEOUS EVERY 5 MIN PRN
Status: DISCONTINUED | OUTPATIENT
Start: 2019-02-25 | End: 2019-02-25 | Stop reason: HOSPADM

## 2019-02-25 RX ORDER — MAGNESIUM HYDROXIDE 1200 MG/15ML
LIQUID ORAL PRN
Status: DISCONTINUED | OUTPATIENT
Start: 2019-02-25 | End: 2019-02-25 | Stop reason: HOSPADM

## 2019-02-25 RX ORDER — CEFAZOLIN SODIUM 1 G/3ML
1 INJECTION, POWDER, FOR SOLUTION INTRAMUSCULAR; INTRAVENOUS SEE ADMIN INSTRUCTIONS
Status: DISCONTINUED | OUTPATIENT
Start: 2019-02-25 | End: 2019-02-25

## 2019-02-25 RX ORDER — NICOTINE POLACRILEX 4 MG
15-30 LOZENGE BUCCAL
Status: DISCONTINUED | OUTPATIENT
Start: 2019-02-25 | End: 2019-02-25

## 2019-02-25 RX ORDER — DEXTROSE MONOHYDRATE 25 G/50ML
25-50 INJECTION, SOLUTION INTRAVENOUS
Status: DISCONTINUED | OUTPATIENT
Start: 2019-02-25 | End: 2019-02-27 | Stop reason: HOSPADM

## 2019-02-25 RX ORDER — AMOXICILLIN 250 MG
1 CAPSULE ORAL 2 TIMES DAILY
Status: DISCONTINUED | OUTPATIENT
Start: 2019-02-25 | End: 2019-02-27 | Stop reason: HOSPADM

## 2019-02-25 RX ORDER — SODIUM CHLORIDE 9 MG/ML
INJECTION, SOLUTION INTRAVENOUS CONTINUOUS
Status: DISCONTINUED | OUTPATIENT
Start: 2019-02-25 | End: 2019-02-26 | Stop reason: CLARIF

## 2019-02-25 RX ORDER — DEXTROSE MONOHYDRATE 25 G/50ML
25-50 INJECTION, SOLUTION INTRAVENOUS
Status: DISCONTINUED | OUTPATIENT
Start: 2019-02-25 | End: 2019-02-25

## 2019-02-25 RX ORDER — CEFAZOLIN SODIUM 2 G/100ML
2 INJECTION, SOLUTION INTRAVENOUS EVERY 8 HOURS
Status: COMPLETED | OUTPATIENT
Start: 2019-02-25 | End: 2019-02-25

## 2019-02-25 RX ORDER — LIDOCAINE 40 MG/G
CREAM TOPICAL
Status: DISCONTINUED | OUTPATIENT
Start: 2019-02-25 | End: 2019-02-27 | Stop reason: HOSPADM

## 2019-02-25 RX ORDER — NEOSTIGMINE METHYLSULFATE 1 MG/ML
VIAL (ML) INJECTION PRN
Status: DISCONTINUED | OUTPATIENT
Start: 2019-02-25 | End: 2019-02-25

## 2019-02-25 RX ORDER — ONDANSETRON 2 MG/ML
4 INJECTION INTRAMUSCULAR; INTRAVENOUS EVERY 6 HOURS PRN
Status: DISCONTINUED | OUTPATIENT
Start: 2019-02-25 | End: 2019-02-27 | Stop reason: HOSPADM

## 2019-02-25 RX ORDER — ALBUTEROL SULFATE 90 UG/1
AEROSOL, METERED RESPIRATORY (INHALATION) PRN
Status: DISCONTINUED | OUTPATIENT
Start: 2019-02-25 | End: 2019-02-25

## 2019-02-25 RX ORDER — DULOXETIN HYDROCHLORIDE 30 MG/1
60 CAPSULE, DELAYED RELEASE ORAL EVERY EVENING
Status: DISCONTINUED | OUTPATIENT
Start: 2019-02-25 | End: 2019-02-27 | Stop reason: HOSPADM

## 2019-02-25 RX ORDER — GLYCOPYRROLATE 0.2 MG/ML
INJECTION, SOLUTION INTRAMUSCULAR; INTRAVENOUS PRN
Status: DISCONTINUED | OUTPATIENT
Start: 2019-02-25 | End: 2019-02-25

## 2019-02-25 RX ORDER — ONDANSETRON 4 MG/1
4 TABLET, ORALLY DISINTEGRATING ORAL EVERY 6 HOURS PRN
Status: DISCONTINUED | OUTPATIENT
Start: 2019-02-25 | End: 2019-02-27 | Stop reason: HOSPADM

## 2019-02-25 RX ORDER — HYDROMORPHONE HYDROCHLORIDE 2 MG/1
2-4 TABLET ORAL
Status: DISCONTINUED | OUTPATIENT
Start: 2019-02-25 | End: 2019-02-27 | Stop reason: HOSPADM

## 2019-02-25 RX ORDER — CALCIUM CARBONATE 300MG(750)
400 TABLET,CHEWABLE ORAL EVERY EVENING
COMMUNITY

## 2019-02-25 RX ORDER — GLIPIZIDE AND METFORMIN HCL 5; 500 MG/1; MG/1
1 TABLET, FILM COATED ORAL
Status: DISCONTINUED | OUTPATIENT
Start: 2019-02-25 | End: 2019-02-25

## 2019-02-25 RX ORDER — SODIUM CHLORIDE, SODIUM LACTATE, POTASSIUM CHLORIDE, CALCIUM CHLORIDE 600; 310; 30; 20 MG/100ML; MG/100ML; MG/100ML; MG/100ML
INJECTION, SOLUTION INTRAVENOUS CONTINUOUS
Status: DISCONTINUED | OUTPATIENT
Start: 2019-02-25 | End: 2019-02-25 | Stop reason: HOSPADM

## 2019-02-25 RX ORDER — GABAPENTIN 600 MG/1
600 TABLET ORAL 3 TIMES DAILY
Status: DISCONTINUED | OUTPATIENT
Start: 2019-02-25 | End: 2019-02-25

## 2019-02-25 RX ORDER — NICOTINE POLACRILEX 4 MG
15-30 LOZENGE BUCCAL
Status: DISCONTINUED | OUTPATIENT
Start: 2019-02-25 | End: 2019-02-27 | Stop reason: HOSPADM

## 2019-02-25 RX ORDER — KETAMINE HYDROCHLORIDE 10 MG/ML
INJECTION, SOLUTION INTRAMUSCULAR; INTRAVENOUS PRN
Status: DISCONTINUED | OUTPATIENT
Start: 2019-02-25 | End: 2019-02-25

## 2019-02-25 RX ORDER — FENTANYL CITRATE 50 UG/ML
50 INJECTION, SOLUTION INTRAMUSCULAR; INTRAVENOUS
Status: DISCONTINUED | OUTPATIENT
Start: 2019-02-25 | End: 2019-02-25

## 2019-02-25 RX ORDER — MULTIVIT-MIN/IRON/FOLIC ACID/K 18-600-40
1000 CAPSULE ORAL DAILY
COMMUNITY

## 2019-02-25 RX ORDER — GABAPENTIN 300 MG/1
600 CAPSULE ORAL ONCE
Status: COMPLETED | OUTPATIENT
Start: 2019-02-25 | End: 2019-02-25

## 2019-02-25 RX ORDER — ONDANSETRON 4 MG/1
4 TABLET, ORALLY DISINTEGRATING ORAL EVERY 30 MIN PRN
Status: DISCONTINUED | OUTPATIENT
Start: 2019-02-25 | End: 2019-02-25 | Stop reason: HOSPADM

## 2019-02-25 RX ORDER — ACETAMINOPHEN 325 MG/1
975 TABLET ORAL EVERY 8 HOURS
Status: DISCONTINUED | OUTPATIENT
Start: 2019-02-25 | End: 2019-02-27 | Stop reason: HOSPADM

## 2019-02-25 RX ORDER — FENTANYL CITRATE 50 UG/ML
INJECTION, SOLUTION INTRAMUSCULAR; INTRAVENOUS PRN
Status: DISCONTINUED | OUTPATIENT
Start: 2019-02-25 | End: 2019-02-25

## 2019-02-25 RX ORDER — NALOXONE HYDROCHLORIDE 0.4 MG/ML
.1-.4 INJECTION, SOLUTION INTRAMUSCULAR; INTRAVENOUS; SUBCUTANEOUS
Status: DISCONTINUED | OUTPATIENT
Start: 2019-02-25 | End: 2019-02-27 | Stop reason: HOSPADM

## 2019-02-25 RX ORDER — GABAPENTIN 600 MG/1
600 TABLET ORAL 3 TIMES DAILY
COMMUNITY

## 2019-02-25 RX ORDER — SODIUM CHLORIDE, SODIUM LACTATE, POTASSIUM CHLORIDE, CALCIUM CHLORIDE 600; 310; 30; 20 MG/100ML; MG/100ML; MG/100ML; MG/100ML
INJECTION, SOLUTION INTRAVENOUS CONTINUOUS
Status: DISCONTINUED | OUTPATIENT
Start: 2019-02-25 | End: 2019-02-25

## 2019-02-25 RX ORDER — HYDROMORPHONE HYDROCHLORIDE 1 MG/ML
.3-.5 INJECTION, SOLUTION INTRAMUSCULAR; INTRAVENOUS; SUBCUTANEOUS
Status: DISCONTINUED | OUTPATIENT
Start: 2019-02-25 | End: 2019-02-27 | Stop reason: HOSPADM

## 2019-02-25 RX ORDER — AMOXICILLIN 250 MG
2 CAPSULE ORAL 2 TIMES DAILY
Status: DISCONTINUED | OUTPATIENT
Start: 2019-02-25 | End: 2019-02-27 | Stop reason: HOSPADM

## 2019-02-25 RX ORDER — NALOXONE HYDROCHLORIDE 0.4 MG/ML
.1-.4 INJECTION, SOLUTION INTRAMUSCULAR; INTRAVENOUS; SUBCUTANEOUS
Status: DISCONTINUED | OUTPATIENT
Start: 2019-02-25 | End: 2019-02-26

## 2019-02-25 RX ORDER — DIPHENHYDRAMINE HYDROCHLORIDE 50 MG/ML
25 INJECTION INTRAMUSCULAR; INTRAVENOUS EVERY 6 HOURS PRN
Status: DISCONTINUED | OUTPATIENT
Start: 2019-02-25 | End: 2019-02-25

## 2019-02-25 RX ORDER — ACETAMINOPHEN 325 MG/1
975 TABLET ORAL ONCE
Status: COMPLETED | OUTPATIENT
Start: 2019-02-25 | End: 2019-02-25

## 2019-02-25 RX ORDER — ONDANSETRON 2 MG/ML
INJECTION INTRAMUSCULAR; INTRAVENOUS PRN
Status: DISCONTINUED | OUTPATIENT
Start: 2019-02-25 | End: 2019-02-25

## 2019-02-25 RX ORDER — ZOLPIDEM TARTRATE 5 MG/1
10 TABLET ORAL
Status: DISCONTINUED | OUTPATIENT
Start: 2019-02-26 | End: 2019-02-27 | Stop reason: HOSPADM

## 2019-02-25 RX ORDER — KETOROLAC TROMETHAMINE 30 MG/ML
INJECTION, SOLUTION INTRAMUSCULAR; INTRAVENOUS PRN
Status: DISCONTINUED | OUTPATIENT
Start: 2019-02-25 | End: 2019-02-25

## 2019-02-25 RX ORDER — LIDOCAINE HYDROCHLORIDE 20 MG/ML
INJECTION, SOLUTION INFILTRATION; PERINEURAL PRN
Status: DISCONTINUED | OUTPATIENT
Start: 2019-02-25 | End: 2019-02-25

## 2019-02-25 RX ORDER — ACETAMINOPHEN 325 MG/1
650 TABLET ORAL EVERY 4 HOURS PRN
Status: DISCONTINUED | OUTPATIENT
Start: 2019-02-28 | End: 2019-02-27 | Stop reason: HOSPADM

## 2019-02-25 RX ADMIN — PHENYLEPHRINE HYDROCHLORIDE 100 MCG: 10 INJECTION, SOLUTION INTRAMUSCULAR; INTRAVENOUS; SUBCUTANEOUS at 08:54

## 2019-02-25 RX ADMIN — DULOXETINE HYDROCHLORIDE 60 MG: 30 CAPSULE, DELAYED RELEASE ORAL at 19:41

## 2019-02-25 RX ADMIN — SODIUM CHLORIDE, POTASSIUM CHLORIDE, SODIUM LACTATE AND CALCIUM CHLORIDE: 600; 310; 30; 20 INJECTION, SOLUTION INTRAVENOUS at 06:34

## 2019-02-25 RX ADMIN — ONDANSETRON 4 MG: 2 INJECTION INTRAMUSCULAR; INTRAVENOUS at 09:06

## 2019-02-25 RX ADMIN — SODIUM CHLORIDE: 9 INJECTION, SOLUTION INTRAVENOUS at 11:19

## 2019-02-25 RX ADMIN — DEXMEDETOMIDINE HYDROCHLORIDE 0.4 MCG/KG/HR: 100 INJECTION, SOLUTION INTRAVENOUS at 07:45

## 2019-02-25 RX ADMIN — GABAPENTIN 600 MG: 300 CAPSULE ORAL at 07:02

## 2019-02-25 RX ADMIN — FENTANYL CITRATE 100 MCG: 50 INJECTION, SOLUTION INTRAMUSCULAR; INTRAVENOUS at 07:39

## 2019-02-25 RX ADMIN — Medication 0.5 MG: at 23:54

## 2019-02-25 RX ADMIN — DEXMEDETOMIDINE HYDROCHLORIDE 12 MCG: 100 INJECTION, SOLUTION INTRAVENOUS at 08:14

## 2019-02-25 RX ADMIN — Medication 0.5 MG: at 15:56

## 2019-02-25 RX ADMIN — HYDROMORPHONE HYDROCHLORIDE 4 MG: 2 TABLET ORAL at 19:41

## 2019-02-25 RX ADMIN — SODIUM CHLORIDE, POTASSIUM CHLORIDE, SODIUM LACTATE AND CALCIUM CHLORIDE: 600; 310; 30; 20 INJECTION, SOLUTION INTRAVENOUS at 08:42

## 2019-02-25 RX ADMIN — ACETAMINOPHEN 975 MG: 325 TABLET, FILM COATED ORAL at 21:11

## 2019-02-25 RX ADMIN — Medication 0.5 MG: at 11:18

## 2019-02-25 RX ADMIN — ACETAMINOPHEN 975 MG: 325 TABLET, FILM COATED ORAL at 13:21

## 2019-02-25 RX ADMIN — GABAPENTIN 600 MG: 600 TABLET, FILM COATED ORAL at 18:42

## 2019-02-25 RX ADMIN — GLYCOPYRROLATE 0.8 MG: 0.2 INJECTION, SOLUTION INTRAMUSCULAR; INTRAVENOUS at 09:27

## 2019-02-25 RX ADMIN — METHOCARBAMOL TABLETS 750 MG: 750 TABLET, COATED ORAL at 23:08

## 2019-02-25 RX ADMIN — Medication 0.5 MG: at 17:40

## 2019-02-25 RX ADMIN — CEFAZOLIN SODIUM 2 G: 2 INJECTION, SOLUTION INTRAVENOUS at 15:57

## 2019-02-25 RX ADMIN — NEOSTIGMINE METHYLSULFATE 5 MG: 1 INJECTION, SOLUTION INTRAVENOUS at 09:27

## 2019-02-25 RX ADMIN — FENTANYL CITRATE 50 MCG: 50 INJECTION, SOLUTION INTRAMUSCULAR; INTRAVENOUS at 08:09

## 2019-02-25 RX ADMIN — FENTANYL CITRATE 50 MCG: 50 INJECTION, SOLUTION INTRAMUSCULAR; INTRAVENOUS at 09:49

## 2019-02-25 RX ADMIN — HYDROMORPHONE HYDROCHLORIDE 4 MG: 2 TABLET ORAL at 23:08

## 2019-02-25 RX ADMIN — LIDOCAINE HYDROCHLORIDE 0.2 ML: 10 INJECTION, SOLUTION EPIDURAL; INFILTRATION; INTRACAUDAL; PERINEURAL at 07:20

## 2019-02-25 RX ADMIN — Medication 0.5 MG: at 10:31

## 2019-02-25 RX ADMIN — CEFAZOLIN SODIUM 2 G: 2 INJECTION, SOLUTION INTRAVENOUS at 07:45

## 2019-02-25 RX ADMIN — Medication 0.5 MG: at 13:21

## 2019-02-25 RX ADMIN — ROCURONIUM BROMIDE 50 MG: 10 INJECTION INTRAVENOUS at 07:49

## 2019-02-25 RX ADMIN — GABAPENTIN 600 MG: 600 TABLET, FILM COATED ORAL at 21:11

## 2019-02-25 RX ADMIN — PHENYLEPHRINE HYDROCHLORIDE 150 MCG: 10 INJECTION, SOLUTION INTRAMUSCULAR; INTRAVENOUS; SUBCUTANEOUS at 09:06

## 2019-02-25 RX ADMIN — HYDROMORPHONE HYDROCHLORIDE 0.5 MG: 1 INJECTION, SOLUTION INTRAMUSCULAR; INTRAVENOUS; SUBCUTANEOUS at 08:22

## 2019-02-25 RX ADMIN — ALBUTEROL SULFATE 6 PUFF: 90 AEROSOL, METERED RESPIRATORY (INHALATION) at 09:35

## 2019-02-25 RX ADMIN — Medication 20 MG: at 08:13

## 2019-02-25 RX ADMIN — PROPOFOL 200 MG: 10 INJECTION, EMULSION INTRAVENOUS at 07:39

## 2019-02-25 RX ADMIN — SUCCINYLCHOLINE CHLORIDE 100 MG: 20 INJECTION, SOLUTION INTRAMUSCULAR; INTRAVENOUS; PARENTERAL at 07:40

## 2019-02-25 RX ADMIN — KETOROLAC TROMETHAMINE 30 MG: 30 INJECTION, SOLUTION INTRAMUSCULAR at 09:13

## 2019-02-25 RX ADMIN — SENNOSIDES AND DOCUSATE SODIUM 1 TABLET: 8.6; 5 TABLET ORAL at 21:11

## 2019-02-25 RX ADMIN — Medication 20 MG: at 09:05

## 2019-02-25 RX ADMIN — SODIUM CHLORIDE 1 G: 9 INJECTION, SOLUTION INTRAVENOUS at 08:48

## 2019-02-25 RX ADMIN — GABAPENTIN 600 MG: 600 TABLET, FILM COATED ORAL at 14:10

## 2019-02-25 RX ADMIN — FENTANYL CITRATE 50 MCG: 50 INJECTION, SOLUTION INTRAMUSCULAR; INTRAVENOUS at 09:27

## 2019-02-25 RX ADMIN — Medication 0.5 MG: at 10:11

## 2019-02-25 RX ADMIN — LIDOCAINE HYDROCHLORIDE 60 MG: 20 INJECTION, SOLUTION INFILTRATION; PERINEURAL at 07:39

## 2019-02-25 RX ADMIN — PROPOFOL 100 MG: 10 INJECTION, EMULSION INTRAVENOUS at 07:40

## 2019-02-25 RX ADMIN — HYDROMORPHONE HYDROCHLORIDE 0.5 MG: 1 INJECTION, SOLUTION INTRAMUSCULAR; INTRAVENOUS; SUBCUTANEOUS at 08:08

## 2019-02-25 RX ADMIN — CEFAZOLIN SODIUM 2 G: 2 INJECTION, SOLUTION INTRAVENOUS at 23:08

## 2019-02-25 RX ADMIN — SODIUM CHLORIDE 1 G: 9 INJECTION, SOLUTION INTRAVENOUS at 07:52

## 2019-02-25 RX ADMIN — MIDAZOLAM 2 MG: 1 INJECTION INTRAMUSCULAR; INTRAVENOUS at 07:34

## 2019-02-25 RX ADMIN — ACETAMINOPHEN 975 MG: 325 TABLET, FILM COATED ORAL at 07:02

## 2019-02-25 ASSESSMENT — ACTIVITIES OF DAILY LIVING (ADL)
ADLS_ACUITY_SCORE: 11

## 2019-02-25 ASSESSMENT — LIFESTYLE VARIABLES: TOBACCO_USE: 0

## 2019-02-25 ASSESSMENT — ENCOUNTER SYMPTOMS: SEIZURES: 0

## 2019-02-25 ASSESSMENT — MIFFLIN-ST. JEOR: SCORE: 1800.61

## 2019-02-25 NOTE — ANESTHESIA CARE TRANSFER NOTE
Patient: Jeronimo Langston    Procedure(s):  LEFT TOTAL KNEE ARTHROPLASTY    Diagnosis: LEFT KNEE DJD  Diagnosis Additional Information: No value filed.    Anesthesia Type:   Spinal, Periph. Nerve Block for postop pain     Note:  Airway :Face Mask  Patient transferred to:PACU  Comments: Pt to PACU with O2 via mask, airway patent, VSS. Report to RN.Handoff Report: Identifed the Patient, Identified the Reponsible Provider, Reviewed the pertinent medical history, Discussed the surgical course, Reviewed Intra-OP anesthesia mangement and issues during anesthesia, Set expectations for post-procedure period and Allowed opportunity for questions and acknowledgement of understanding      Vitals: (Last set prior to Anesthesia Care Transfer)    CRNA VITALS  2/25/2019 0914 - 2/25/2019 0952      2/25/2019             Pulse:  118    SpO2:  94 %    Resp Rate (set):  10                Electronically Signed By: ELAINE Wise CRNA  February 25, 2019  9:52 AM

## 2019-02-25 NOTE — CONSULTS
Consult Date:  02/25/2019      REQUESTING PHYSICIAN:  Nicholas Huff MD      REASON FOR CONSULTATION:  Hospitalist consult/assist with medical co-management.      HISTORY OF PRESENT ILLNESS:  Mr. Langston is a 53-year-old gentleman with past medical history of anxiety, depression, posttraumatic DVT approximately 4 years ago when he fell off of a truck onto a table, injuring his leg, received a short course of anticoagulation and tolerated subsequent orthopedic surgery without any recurrent DVT.  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  The patient has been having knee pain on the left side for approximately the last 1 year.  On 02/25/2019 with Dr. Nicholas Huff he underwent left total knee arthroplasty with spinal anesthesia, peripheral nerve block and general endotracheal anesthesia.  He received perioperative tranexamic acid and Ancef.  Duration of surgery was 2 hours.  Intraoperative blood pressures ranged from 90s to 150.  Intake was 1.7 liters.  EBL 10 mL, urine output was not documented.  He was extubated at the end of his procedure and admitted to station 55 for further postoperative evaluation and management.  Hospitalist Service was asked to see him in the postoperative period to assist with management of his medical comorbid conditions a postoperative state.     The patient has been taking stool softeners in anticipation of his surgery after having been constipated with a prior orthopedic surgery.  Regarding his DM2, the patient checks his blood sugars 3 times a day, generally runs  with a goal glucose of 100.  Follows a diabetic diet.  He has been working with a dietitian in [place], [place].  Reports significant neuropathy, taking Neurontin 4 times a day.  His neuropathy is certainly worse with beer intake, but he tolerates Fresca and vodka without difficulty.  He thinks he has approximately once a month  hypoglycemic episodes and feels shaky and sweaty.  Occasionally has low back pain, traumatic type relieved with NSAIDs.  Otherwise no significant changes since his preoperative medical evaluation which was done through his PCP's office at [facility].     PAST MEDICAL HISTORY:   1.  Anxiety.   2.  Depression.   3.  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    4.  Post-traumatic DVT, tolerated orthopedic surgery without any thrombotic complications thereafter.   5.  Bilateral knee osteoarthritis.     6.  DM 2 with neuropathy.   7.  Possible hyperlipidemia.      PAST SURGICAL HISTORY:   Past Surgical History:   Procedure Laterality Date     ARTHROPLASTY KNEE Right 8/15/2016    Procedure: ARTHROPLASTY KNEE;  Surgeon: Agustín Freire MD;  Location: [facility] OR     ARTHROSCOPY SHOULDER ROTATOR CUFF REPAIR Right 1/29/2016    Procedure: ARTHROSCOPY SHOULDER ROTATOR CUFF REPAIR;  Surgeon: Marcela Kirby MD;  Location: [facility] OR     ARTHROSCOPY SHOULDER, OPEN BICEP TENODESIS REPAIR, COMBINED Right 1/29/2016    Procedure: COMBINED ARTHROSCOPY SHOULDER, OPEN BICEP TENODESIS REPAIR;  Surgeon: Marcela Kirby MD;  Location: [facility] OR     C SHOULDER SURG PROC UNLISTED      Left side     EYE SURGERY      enucleation 8/2018     HC VASECTOMY UNILAT/BILAT W POSTOP SEMEN  1992    Vasectomy     HEAD & NECK SURGERY      lipoma removal neck     HERNIA REPAIR       KNEE SURGERY      Right knee     VASECTOMY          ALLERGIES:      Allergies   Allergen Reactions     Oxycodone Other (See Comments), Nausea and Visual Disturbance     Nausea, sweats, blurred vision     No Known Drug Allergies      Lovastatin Rash        OUTPATIENT MEDICATIONS:   Medications Prior to Admission   Medication Sig Dispense Refill Last Dose     Ascorbic Acid (VITAMIN C) 500 MG CAPS Take 500 mg by mouth daily   2/24/2019 at am     ASPIRIN PO Take 81 mg by mouth daily   2/18/2019     DULoxetine (CYMBALTA) 60 MG capsule Take 60 mg by mouth every  evening    2/24/2019 at pm     gabapentin (NEURONTIN) 600 MG tablet Take 600 mg by mouth 3 times daily   2/24/2019 at 1700     glipiZIDE-metFORMIN (METAGLIP) 5-500 MG tablet Take 1 tablet by mouth 2 times daily (before meals)   2/24/2019 at 1700     Magnesium 400 MG TABS Take 400 mg by mouth every evening   2/24/2019 at 1700     multivitamin w/minerals (THERA-VIT-M) tablet Take 1 tablet by mouth daily   2/24/2019 at 1700     Omega-3 Fatty Acids (OMEGA-3 FISH OIL PO) Take 1,200 mg by mouth daily    2/18/2019     TURMERIC PO Take 500 mg by mouth daily    2/18/2019     Vitamin D, Cholecalciferol, 1000 units TABS Take 1,000 Units by mouth daily   2/24/2019 at am     zolpidem (AMBIEN) 10 MG tablet Take 10 mg by mouth every evening    2/24/2019 at pm        SOCIAL HISTORY:  Drinks 1-2 drinks, perhaps more, on the weekends only, tries not to drink on the weekdays.  Former 2 pack a day smoker, quit in 2001.  Nondrug user.  , lives with his mom in [place], [place].      FAMILY MEDICAL HISTORY:  Mom alive at age 88 with hypertension and chronic kidney disease.     REVIEW OF SYSTEMS:  A 10-point review of systems negative aside from what is described in the HPI.      PHYSICAL EXAMINATION:   General:  Middle aged gentleman lying in bed, mild distress from LLE pain  Neuro: +follows commands wiggle toes and show 2 fingers bilat, face symmetric, tongue midline, speech fluent  HEENT: NC/AT, pupils equal round 3mm on the R only, left is prosthetic, nonreactive,  sclera nonicteric, noninjected, conjunctiva pink, dry oral mucosa  Neck: supple  Heart: S1S2, no murmurs, /79,   Lungs: CTAB upper and lower lobes  Abd:normoactive bowel sounds, soft, nontender, nondisteded  Ext: no edema  MUSCULOSKELETAL:  Left lower extremity Ace wrapped from thigh to toes.  Currently, has ice packs on it.  Sensation and movement are preserved.  Pulse is easily palpable in the dorsalis pedis location.  Capillary refill is brisk.       ASSESSMENT AND PLAN:  Mr. 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 He is status post left total knee arthroplasty on 02/25/2019 with Dr. Nicholas Huff.  Hospitalist Service has been asked to see him in consultation to assist with management of medical comorbid conditions in the postoperative period.      PROBLEM LIST AND PLAN:     Status post left total knee arthroplasty on 02/25/2019 with Dr. Nicholas Huff.   -- We defer to the primary surgical service, analgesia, mobility, therapies, pharmacologic prophylaxis.       DM2, well controlled with A1c of 6.3% on 02/20/2019.  He has significant neuropathy and takes Neurontin 4 times a day.  He also has approximately once a month hypoglycemic episodes.   -- We will start before meals and at bedtime medium dose sliding scale insulin.   -- Will hold his glipizide and metformin.   -- Will change to moderate carbohydrate diet.   -- Will change Neurontin to 4 times a day with doses that 5:00 a.m., noon, dinnertime, bedtime.   -- The patient has been counseled about diabetic foot care after he told me about a recent visit for a pedicure.  He has been encouraged to follow up with his primary care provider for further diabetic foot care details.     Anxiety.   Depression.   -- PTA Cymbalta has already been resumed.       Left eye blindness with left prosthetic eye.  This should just be noted on his neurologic exam that the left eye is prosthetic and the pupil does not react.     History of DVT, post trauma.   -- Enoxaparin is ordered for prophylaxis starting on 02/26/2019.   -- The patient also has a PCDs and VIKTORIA hose on the right lower extremity.       Diastolic hypertension with few readings in the  80s.   -- Suspect this is related to pain.   -- If it persists, given his short anticipated hospitalization would refer to his primary provider for further evaluation and management.     Lines:  An 18-gauge catheter in the right hand.        Pulm prophylaxis  --incentive spirometry teaching provided    CODE STATUS:    --Full code status noted        We thank you for this interesting consult.      Total time is 31 minutes of which 22 minutes was face-to-face time, remainder spent in the counseling and coordination of care.      The patient will be independently evaluated by Dr. Magdi LEGGETT MD       As dictated by JAY RAYMUNDO, APRN, CNP            D: 2019   T: 2019   MT: ELISA      Name:     MOLLY HALL   MRN:      [id number]        Account:       [id number]   :      1965           Consult Date:  2019      Document: [id number]       cc: [facility]        Sudheer Leggett MD

## 2019-02-25 NOTE — PROGRESS NOTES
02/25/19 1605   Quick Adds   Type of Visit Initial PT Evaluation   Living Environment   Lives With parent(s)  (mother lives with pt)   Living Arrangements house  (one story)   Home Accessibility no concerns   Self-Care   Usual Activity Tolerance good   Current Activity Tolerance moderate   Regular Exercise No   Equipment Currently Used at Home none   Functional Level Prior   Ambulation 0-->independent   Transferring 0-->independent   Fall history within last six months yes   Number of times patient has fallen within last six months 2   General Information   Onset of Illness/Injury or Date of Surgery - Date 02/25/19   Referring Physician CHRISTINA Huff MD   Patient/Family Goals Statement return home with OPPT   Pertinent History of Current Problem (include personal factors and/or comorbidities that impact the POC) POD 0 L TKA. PMH: anxiety, chronic pain, depression, DM2, DVT, blind in the L eye with a prosthetic eye   Precautions/Limitations fall precautions   Weight-Bearing Status - LLE weight-bearing as tolerated   Weight-Bearing Status - RLE full weight-bearing   Cognitive Status Examination   Orientation orientation to person, place and time   Level of Consciousness alert   Follows Commands and Answers Questions 100% of the time;able to follow multistep instructions   Personal Safety and Judgment intact   Memory intact   Pain Assessment   Patient Currently in Pain Yes, see Vital Sign flowsheet  (6-7/10)   Posture    Posture Not impaired   Range of Motion (ROM)   ROM Comment R LE WFL, L LE limited by pain and surgery   Strength   Strength Comments R LE WFL, L LE functionally weak   Bed Mobility   Bed Mobility Comments Supine to sit with SBA   Transfer Skills   Transfer Comments Sit to stand with FWW and CGA   Gait   Gait Comments Pt amb 10 ft with FWW and CGA   Balance   Balance Comments Balance steady with FWW   Sensory Examination   Sensory Perception Comments Denies numbness or tingling   General Therapy  "Interventions   Planned Therapy Interventions bed mobility training;gait training;strengthening;transfer training;ROM   Clinical Impression   Criteria for Skilled Therapeutic Intervention yes, treatment indicated   PT Diagnosis Difficulty ambulating   Influenced by the following impairments Pain, dec strength, balance, activity tolerance, ROM   Functional limitations due to impairments Diffiuclty ambulating and transferring   Clinical Presentation Stable/Uncomplicated   Clinical Presentation Rationale medically stable post-op   Clinical Decision Making (Complexity) Low complexity   Therapy Frequency` 2 times/day   Predicted Duration of Therapy Intervention (days/wks) 3 days   Anticipated Discharge Disposition Home with Outpatient Therapy   Risk & Benefits of therapy have been explained Yes   Patient, Family & other staff in agreement with plan of care Yes   [facility] TM \"6 Clicks\"   2016, Trustees of [facility], under license to [employer].  All rights reserved.   6 Clicks Short Forms Basic Mobility Inpatient Short Form   [facility]  \"6 Clicks\" V.2 Basic Mobility Inpatient Short Form   1. Turning from your back to your side while in a flat bed without using bedrails? 3 - A Little   2. Moving from lying on your back to sitting on the side of a flat bed without using bedrails? 3 - A Little   3. Moving to and from a bed to a chair (including a wheelchair)? 3 - A Little   4. Standing up from a chair using your arms (e.g., wheelchair, or bedside chair)? 3 - A Little   5. To walk in hospital room? 3 - A Little   6. Climbing 3-5 steps with a railing? 3 - A Little   Basic Mobility Raw Score (Score out of 24.Lower scores equate to lower levels of function) 18   Total Evaluation Time   Total Evaluation Time (Minutes) 15     "

## 2019-02-25 NOTE — ANESTHESIA PREPROCEDURE EVALUATION
Anesthesia Pre-Procedure Evaluation    Patient: Jeronimo Langston   MRN: [id number] : 1965          Preoperative Diagnosis: LEFT KNEE DJD    Procedure(s):  LEFT TOTAL KNEE ARTHROPLASTY(SMITH & NEPHEW)^    Past Medical History:   Diagnosis Date     Anxiety states      Arthritis     osteoarthritis both knees     Blindness of left eye      Chronic pain syndrome      Diabetes (H)     type 2     DVT (deep vein thrombosis) in pregnancy (H)      Hyperlipidemia, mixed      Lipoma of neck      Major depression, single episode      Past Surgical History:   Procedure Laterality Date     ARTHROPLASTY KNEE Right 8/15/2016    Procedure: ARTHROPLASTY KNEE;  Surgeon: Agustín Freire MD;  Location: [facility] OR     ARTHROSCOPY SHOULDER ROTATOR CUFF REPAIR Right 2016    Procedure: ARTHROSCOPY SHOULDER ROTATOR CUFF REPAIR;  Surgeon: Marcela Kirby MD;  Location: [facility] OR     ARTHROSCOPY SHOULDER, OPEN BICEP TENODESIS REPAIR, COMBINED Right 2016    Procedure: COMBINED ARTHROSCOPY SHOULDER, OPEN BICEP TENODESIS REPAIR;  Surgeon: Marcela Kirby MD;  Location: [facility] OR     C SHOULDER SURG PROC UNLISTED      Left side     EYE SURGERY      enucleation 2018     HC VASECTOMY UNILAT/BILAT W POSTOP SEMEN  1992    Vasectomy     HEAD & NECK SURGERY      lipoma removal neck     HERNIA REPAIR       KNEE SURGERY      Right knee     VASECTOMY         Anesthesia Evaluation     . Pt has had prior anesthetic.     No history of anesthetic complications          ROS/MED HX    ENT/Pulmonary:      (-) tobacco use, asthma and sleep apnea   Neurologic:     (+)neuropathy - patient with bilateral le neuropathy, worse since last tkr and block,    (-) seizures and CVA   Cardiovascular: Comment: Echo  ef 53%    Normal stress test    (+) Dyslipidemia, ----. : . . . :. .       METS/Exercise Tolerance:     Hematologic:     (+) History of blood clots (hx of dvt 4 years ago after an accident) -      Musculoskeletal:        "  GI/Hepatic:        (-) GERD   Renal/Genitourinary:         Endo:     (+) type II DM .      Psychiatric:     (+) psychiatric history anxiety      Infectious Disease:         Malignancy:         Other:                          Physical Exam  Normal systems: dental    Airway   Mallampati: II  TM distance: >3 FB  Neck ROM: full    Dental     Cardiovascular   Rhythm and rate: regular and normal      Pulmonary    breath sounds clear to auscultation            Lab Results   Component Value Date    WBC 8.1 03/18/2003    HGB 14.1 02/25/2019    HCT 43.7 03/18/2003     08/18/2016    CRP 3.4 05/02/2017    SED 6 05/02/2017     02/25/2019    POTASSIUM 4.6 02/25/2019    CHLORIDE 108 02/25/2019    CO2 26 02/25/2019    BUN 21 02/25/2019    CR 0.71 02/25/2019     (H) 02/25/2019    ZOIE 8.8 02/25/2019    ALT 61 (A) 12/02/2015       Preop Vitals  BP Readings from Last 3 Encounters:   02/25/19 (!) 137/94   12/12/17 (!) 149/96   05/16/17 145/89    Pulse Readings from Last 3 Encounters:   12/12/17 103   05/16/17 92   05/02/17 91      Resp Readings from Last 3 Encounters:   02/25/19 15   09/06/16 18   08/18/16 16    SpO2 Readings from Last 3 Encounters:   02/25/19 97%   12/12/17 96%   05/02/17 93%      Temp Readings from Last 1 Encounters:   01/03/17 36.8  C (98.2  F) (Oral)    Ht Readings from Last 1 Encounters:   02/25/19 1.753 m (5' 9\")      Wt Readings from Last 1 Encounters:   02/25/19 97.5 kg (215 lb)    Estimated body mass index is 31.75 kg/m  as calculated from the following:    Height as of this encounter: 1.753 m (5' 9\").    Weight as of this encounter: 97.5 kg (215 lb).       Anesthesia Plan      History & Physical Review  History and physical reviewed and following examination; no interval change.    ASA Status:  2 .        Plan for General with Intravenous induction. Maintenance will be Balanced.    PONV prophylaxis:  Ondansetron (or other 5HT-3) and Dexamethasone or Solumedrol  Plan for preop tylenol, " oxycontin, gabapentin  No block per patient request as he has a neurologist wokring up a waxing and waning but progressing neuropathy  No spinal      Postoperative Care  Postoperative pain management:  IV analgesics.      Consents  Anesthetic plan, risks, benefits and alternatives discussed with:  Patient..                 Amna Alaniz

## 2019-02-25 NOTE — PLAN OF CARE
PT:  Discharge Planner PT   Patient plan for discharge: Return home with OPPT  Current status: Orders received, eval completed, treatment initiated. Pt is POD 0 L TKA. Prior to admit pt was living in a one story house with no stairs to enter, his mother lives with him. Pt does not use an AD and is independent with mobility at baseline. Currently requires SBA for supine to/from sit, CGA sit to/from stand with FWW, CGA for gait of 50 ft with FWW without KI. Pt feeling hot and clamy, returned to bed. Participated well in LE strengthening. Pt demonstrates pain, dec strength, balance, activity tolerance, ROM and difficulty ambulating and transferring and would benefit from skilled PT services in order to improve this. AAROM 9-70 deg L knee.  Barriers to return to prior living situation: None  Recommendations for discharge: Return home with OPPT as planned  Rationale for recommendations: Pt would benefit from continued PT to improve strength, balance, ROM, mobility to increase independence, reduce falls risk and increase safety before returning home. Pt will be safe returning home with his mother present.       Entered by: Kathy Pedroza 02/25/2019 4:47 PM

## 2019-02-25 NOTE — OP NOTE
PATIENT NAME:  Jeronimo Langston  MEDICAL RECORD #: [id number]  PATIENT BIRTHDAY:  1965  DATE OF SURGERY: 2/25/2019    SURGEON:    Nicholas Huff MD    1st ASSISTANT:  DERRICK Parker OPA-C    PREOPERATIVE DIAGNOSIS:  Degenerative osteoarthritis left knee.    POSTOPERATIVE DIAGNOSIS:  Degenerative osteoarthritis left knee.    PROCEDURE: left total knee arthroplasty.    COMPONENTS: Smith [employer] Nephew - Size 6 CR femoral component, size 5 fully stemmed tibial baseplate with 10 mm CR XLPE high flexion articular insert, and 32 mm patellar component.    ANESTHESIA: Spinal     INDICATIONS FOR PROCEDURE:  The patient was brought to the operating room for elective total knee replacement for advanced degenerative osteoarthritis.  The patient received IV antibiotics preoperatively.  These will be continued for 24 hours.  The patient also will receive anticoagulants  for postoperative thrombosis prophylaxis.  The patient understands the indications, alternatives, risks, benefits, and time involved for recovery and wishes to proceed.  The patient is consented for the procedure.      DESCRIPTION OF PROCEDURE:  The patient was brought to the operating room  and following suitable Spinal anesthesia, the left knee was prepped and draped in the usual manner.  Full timeout was carried out and the patient and proper extremity and operative site identified and confirmed by all members of the operative team.    We next exsanguinated the operative leg with a Daryl bandage and the tourniquet was elevated to 350 mmHg on the thigh.    A 10 cm longitudinal incision was made anteriorly for the MIS technique.  Sharp dissection was carried down through the subcutaneous tissue.  A median parapatellar arthrotomy was carried out and the knee flexed to 90 degrees.    There was severe wear in the medial compartment and moderate wear in the patellofemoral  compartment and moderate wear in the lateral compartment.    The Smith [employer] Nephew  instrumentation was used.  The femur was cut for a size 6 CR  implant.  The tibia was cut for a size 5 fully stemmed base plate.  The patella was cut for a 32 mm patellar button.    The trial components were removed from the knee.  The bone surfaces were prepared with jet lavage and careful drying technique.     The posterior capsule was injected for postoperative relief with 30 cc of saline, 30 cc of 0.2% Ropivacaine, and 15 mg of Toradol.       We then cemented the components with HD Simplex cement beginning with the tibial baseplate, followed by the femoral component, followed by the patellar component.    The knee was held in extension with the trial insert in place in the tibia until the cement was set.  Once the cement was set up, the trial component was removed.  We selected the 10 mm CR XLPE high flexion articular insert.  This insert was secured and the knee checked one final time for motion, stability, alignment and soft tissue balance, all of which were excellent.    The wound was irrigated throughout with antibiotic solution using jet lavage.  The tourniquet was deflated prior to wound closure and all bleeding controlled with electrocautery.  We then re-exsanguinated the leg and reinflated the tourniquet during wound closure.    The wound was closed over a medium Hemovac drain with spaced 0 Ethibond interrupted and V-Loc running suture in the parapatellar arthrotomy, 2-0 undyed Vicryl in subcutaneous tissue and skin staples in the skin.  A sterile soft dressing was applied.  The tourniquet deflated one final time.  The patient was taken to the PAR in satisfactory condition.  There were no known complications during the procedure.    A surgical assistant was medically necessary for this procedure for pre-op positioning as well as intra-op retraction and extremity support and positioning.  He was present for the entire procedure.      LAWANDA JUAREZ MD    CC  Lawanda Juarez MD          [phone]  Fax

## 2019-02-25 NOTE — PROGRESS NOTES
Admission medication history interview status for the 2/25/2019  admission is complete. See EPIC admission navigator for prior to admission medications     Medication history source reliability:Good    Medication history interview source(s):Patient    Medication history resources (including written lists, pill bottles, clinic record):None    Primary pharmacy[facility]    Additional medication history information not noted on PTA med list :None    Time spent in this activity: 45 minutes    Prior to Admission medications    Medication Sig Last Dose Taking? Auth Provider   Ascorbic Acid (VITAMIN C) 500 MG CAPS Take 500 mg by mouth daily 2/24/2019 at am Yes Reported, Patient   ASPIRIN PO Take 81 mg by mouth daily 2/18/2019 Yes Reported, Patient   DULoxetine (CYMBALTA) 60 MG capsule Take 60 mg by mouth every evening  2/24/2019 at pm Yes Reported, Patient   gabapentin (NEURONTIN) 600 MG tablet Take 600 mg by mouth 3 times daily 2/24/2019 at 1700 Yes Reported, Patient   glipiZIDE-metFORMIN (METAGLIP) 5-500 MG tablet Take 1 tablet by mouth 2 times daily (before meals) 2/24/2019 at 1700 Yes Reported, Patient   Magnesium 400 MG TABS Take 400 mg by mouth every evening 2/24/2019 at 1700 Yes Reported, Patient   multivitamin w/minerals (THERA-VIT-M) tablet Take 1 tablet by mouth daily 2/24/2019 at 1700 Yes Reported, Patient   Omega-3 Fatty Acids (OMEGA-3 FISH OIL PO) Take 1,200 mg by mouth daily  2/18/2019 Yes Reported, Patient   TURMERIC PO Take 500 mg by mouth daily  2/18/2019 Yes Reported, Patient   Vitamin D, Cholecalciferol, 1000 units TABS Take 1,000 Units by mouth daily 2/24/2019 at am Yes Reported, Patient   zolpidem (AMBIEN) 10 MG tablet Take 10 mg by mouth every evening  2/24/2019 at pm Yes Reported, Patient

## 2019-02-25 NOTE — ANESTHESIA POSTPROCEDURE EVALUATION
Patient: Jeronimo Langston    Procedure(s):  LEFT TOTAL KNEE ARTHROPLASTY    Diagnosis:LEFT KNEE DJD  Diagnosis Additional Information: No value filed.    Anesthesia Type:  Spinal, Periph. Nerve Block for postop pain    Note:  Anesthesia Post Evaluation    Patient location during evaluation: PACU  Patient participation: Able to fully participate in evaluation  Level of consciousness: awake  Pain management: adequate  Airway patency: patent  Cardiovascular status: acceptable  Respiratory status: acceptable  Hydration status: acceptable  PONV: none     Anesthetic complications: None          Last vitals:  Vitals:    02/25/19 1031 02/25/19 1040 02/25/19 1100   BP:  (!) 120/94 118/81   Pulse:  87 86   Resp: 12 8 16   Temp:   36.3  C (97.4  F)   SpO2:  97% 95%         Electronically Signed By: Amna Alaniz  February 25, 2019  11:44 AM

## 2019-02-26 ENCOUNTER — APPOINTMENT (OUTPATIENT)
Dept: PHYSICAL THERAPY | Facility: CLINIC | Age: 54
End: 2019-02-26
Attending: ORTHOPAEDIC SURGERY
Payer: COMMERCIAL

## 2019-02-26 LAB
ANION GAP SERPL CALCULATED.3IONS-SCNC: 6 MMOL/L (ref 3–14)
BUN SERPL-MCNC: 11 MG/DL (ref 7–30)
CALCIUM SERPL-MCNC: 8.7 MG/DL (ref 8.5–10.1)
CHLORIDE SERPL-SCNC: 106 MMOL/L (ref 94–109)
CO2 SERPL-SCNC: 27 MMOL/L (ref 20–32)
CREAT SERPL-MCNC: 0.76 MG/DL (ref 0.66–1.25)
GFR SERPL CREATININE-BSD FRML MDRD: >90 ML/MIN/{1.73_M2}
GLUCOSE BLDC GLUCOMTR-MCNC: 141 MG/DL (ref 70–99)
GLUCOSE BLDC GLUCOMTR-MCNC: 145 MG/DL (ref 70–99)
GLUCOSE BLDC GLUCOMTR-MCNC: 155 MG/DL (ref 70–99)
GLUCOSE BLDC GLUCOMTR-MCNC: 172 MG/DL (ref 70–99)
GLUCOSE SERPL-MCNC: 202 MG/DL (ref 70–99)
HGB BLD-MCNC: 12.4 G/DL (ref 13.3–17.7)
PLATELET # BLD AUTO: 181 10E9/L (ref 150–450)
POTASSIUM SERPL-SCNC: 4.2 MMOL/L (ref 3.4–5.3)
SODIUM SERPL-SCNC: 139 MMOL/L (ref 133–144)

## 2019-02-26 PROCEDURE — 97116 GAIT TRAINING THERAPY: CPT | Mod: GP | Performed by: PHYSICAL THERAPY ASSISTANT

## 2019-02-26 PROCEDURE — 85018 HEMOGLOBIN: CPT | Performed by: ORTHOPAEDIC SURGERY

## 2019-02-26 PROCEDURE — 36415 COLL VENOUS BLD VENIPUNCTURE: CPT | Performed by: ORTHOPAEDIC SURGERY

## 2019-02-26 PROCEDURE — 00000146 ZZHCL STATISTIC GLUCOSE BY METER IP

## 2019-02-26 PROCEDURE — 80048 BASIC METABOLIC PNL TOTAL CA: CPT | Performed by: ORTHOPAEDIC SURGERY

## 2019-02-26 PROCEDURE — 85049 AUTOMATED PLATELET COUNT: CPT | Performed by: ORTHOPAEDIC SURGERY

## 2019-02-26 PROCEDURE — 25000128 H RX IP 250 OP 636: Performed by: ORTHOPAEDIC SURGERY

## 2019-02-26 PROCEDURE — 97110 THERAPEUTIC EXERCISES: CPT | Mod: GP

## 2019-02-26 PROCEDURE — 97116 GAIT TRAINING THERAPY: CPT | Mod: GP

## 2019-02-26 PROCEDURE — 12000000 ZZH R&B MED SURG/OB

## 2019-02-26 PROCEDURE — 25000128 H RX IP 250 OP 636: Performed by: HOSPITALIST

## 2019-02-26 PROCEDURE — 25000132 ZZH RX MED GY IP 250 OP 250 PS 637: Performed by: NURSE PRACTITIONER

## 2019-02-26 PROCEDURE — 25000132 ZZH RX MED GY IP 250 OP 250 PS 637: Performed by: ORTHOPAEDIC SURGERY

## 2019-02-26 PROCEDURE — 25000132 ZZH RX MED GY IP 250 OP 250 PS 637: Performed by: INTERNAL MEDICINE

## 2019-02-26 PROCEDURE — 97110 THERAPEUTIC EXERCISES: CPT | Mod: GP | Performed by: PHYSICAL THERAPY ASSISTANT

## 2019-02-26 PROCEDURE — 40000894 ZZH STATISTIC OT IP EVAL DEFER

## 2019-02-26 PROCEDURE — 99232 SBSQ HOSP IP/OBS MODERATE 35: CPT | Performed by: INTERNAL MEDICINE

## 2019-02-26 RX ORDER — LABETALOL 20 MG/4 ML (5 MG/ML) INTRAVENOUS SYRINGE
10
Status: DISCONTINUED | OUTPATIENT
Start: 2019-02-26 | End: 2019-02-27 | Stop reason: HOSPADM

## 2019-02-26 RX ORDER — LORAZEPAM 0.5 MG/1
.5-1 TABLET ORAL EVERY 4 HOURS PRN
Status: DISCONTINUED | OUTPATIENT
Start: 2019-02-26 | End: 2019-02-27 | Stop reason: HOSPADM

## 2019-02-26 RX ORDER — HYDROMORPHONE HYDROCHLORIDE 1 MG/ML
0.5 INJECTION, SOLUTION INTRAMUSCULAR; INTRAVENOUS; SUBCUTANEOUS ONCE
Status: COMPLETED | OUTPATIENT
Start: 2019-02-26 | End: 2019-02-26

## 2019-02-26 RX ORDER — HYDROMORPHONE HYDROCHLORIDE 2 MG/1
2-4 TABLET ORAL EVERY 4 HOURS PRN
Qty: 40 TABLET | Refills: 0 | Status: SHIPPED | OUTPATIENT
Start: 2019-02-26

## 2019-02-26 RX ORDER — AMOXICILLIN 250 MG
1 CAPSULE ORAL 2 TIMES DAILY
Qty: 50 TABLET | Refills: 0 | Status: SHIPPED | OUTPATIENT
Start: 2019-02-26

## 2019-02-26 RX ADMIN — ENOXAPARIN SODIUM 40 MG: 40 INJECTION SUBCUTANEOUS at 08:07

## 2019-02-26 RX ADMIN — ACETAMINOPHEN 975 MG: 325 TABLET, FILM COATED ORAL at 20:59

## 2019-02-26 RX ADMIN — GABAPENTIN 600 MG: 600 TABLET, FILM COATED ORAL at 08:07

## 2019-02-26 RX ADMIN — HYDROMORPHONE HYDROCHLORIDE 4 MG: 2 TABLET ORAL at 03:26

## 2019-02-26 RX ADMIN — GABAPENTIN 600 MG: 600 TABLET, FILM COATED ORAL at 18:02

## 2019-02-26 RX ADMIN — SENNOSIDES AND DOCUSATE SODIUM 1 TABLET: 8.6; 5 TABLET ORAL at 20:58

## 2019-02-26 RX ADMIN — GABAPENTIN 600 MG: 600 TABLET, FILM COATED ORAL at 20:58

## 2019-02-26 RX ADMIN — DULOXETINE HYDROCHLORIDE 60 MG: 30 CAPSULE, DELAYED RELEASE ORAL at 20:58

## 2019-02-26 RX ADMIN — METHOCARBAMOL TABLETS 750 MG: 750 TABLET, COATED ORAL at 12:51

## 2019-02-26 RX ADMIN — HYDROMORPHONE HYDROCHLORIDE 4 MG: 2 TABLET ORAL at 11:03

## 2019-02-26 RX ADMIN — Medication 0.5 MG: at 05:12

## 2019-02-26 RX ADMIN — GABAPENTIN 600 MG: 600 TABLET, FILM COATED ORAL at 12:51

## 2019-02-26 RX ADMIN — ACETAMINOPHEN 975 MG: 325 TABLET, FILM COATED ORAL at 05:11

## 2019-02-26 RX ADMIN — HYDROMORPHONE HYDROCHLORIDE 4 MG: 2 TABLET ORAL at 14:37

## 2019-02-26 RX ADMIN — ZOLPIDEM TARTRATE 10 MG: 5 TABLET, FILM COATED ORAL at 20:58

## 2019-02-26 RX ADMIN — Medication 0.5 MG: at 02:27

## 2019-02-26 RX ADMIN — Medication 0.5 MG: at 01:51

## 2019-02-26 RX ADMIN — HYDROMORPHONE HYDROCHLORIDE 4 MG: 2 TABLET ORAL at 20:59

## 2019-02-26 RX ADMIN — HYDROMORPHONE HYDROCHLORIDE 4 MG: 2 TABLET ORAL at 18:02

## 2019-02-26 RX ADMIN — LORAZEPAM 0.5 MG: 0.5 TABLET ORAL at 14:42

## 2019-02-26 RX ADMIN — SENNOSIDES AND DOCUSATE SODIUM 1 TABLET: 8.6; 5 TABLET ORAL at 08:07

## 2019-02-26 RX ADMIN — HYDROMORPHONE HYDROCHLORIDE 2 MG: 2 TABLET ORAL at 08:07

## 2019-02-26 RX ADMIN — METFORMIN HYDROCHLORIDE 500 MG: 500 TABLET, FILM COATED ORAL at 18:02

## 2019-02-26 RX ADMIN — ACETAMINOPHEN 975 MG: 325 TABLET, FILM COATED ORAL at 14:37

## 2019-02-26 RX ADMIN — METHOCARBAMOL TABLETS 750 MG: 750 TABLET, COATED ORAL at 05:11

## 2019-02-26 ASSESSMENT — ACTIVITIES OF DAILY LIVING (ADL)
ADLS_ACUITY_SCORE: 11

## 2019-02-26 NOTE — PROGRESS NOTES
[facility] Orthopedic Post-Op / Progress Note  Jeronimo Langston is a 53 year old male    Today's Date:2019  Admission Date: 2019  POD # 1 TKA         Interval History:   doing well  CMS good.  Some leg pain from lumbar spine and pre-existing neuropathy.  On Neurontin.            Physical Exam:   All vitals have been reviewed  Temperatures:  Current - Temp: 98.2  F (36.8  C); Max - Temp  Av.8  F (36.6  C)  Min: 97.3  F (36.3  C)  Max: 98.4  F (36.9  C)  Pulse range: Pulse  Av.4  Min: 69  Max: 119  Blood pressure range: Systolic (24hrs), Av , Min:118 , Max:150   ; Diastolic (24hrs), Av, Min:74, Max:94      Intake/Output Summary (Last 24 hours) at 2019 0856  Last data filed at 2019 0644  Gross per 24 hour   Intake 1968.25 ml   Output 6180 ml   Net -4211.75 ml       Dressing dry and intact.          Data:   All laboratory data related to this surgery reviewed      Lab Results   Component Value Date     2019    POTASSIUM 4.2 2019    CHLORIDE 106 2019    CO2 27 2019     (H) 2019     Lab Results   Component Value Date    HGB 12.4 (L) 2019    HGB 14.1 2019    HGB 11.9 (L) 2016     Platelet Count (10e9/L)   Date Value   2019 181   2019 177   2016 168       All imaging studies related to this surgery reviewed  Hardware is intact and in good approximation         Assessment and Plan:    Assessment:  Doing well.  No immediate surgical complications identified.  No excessive bleeding    Plan:  Continue physical therapy  Pain control measures  Discharge plan: Home tomorrow afternoon    Nicholas Huff MD

## 2019-02-26 NOTE — PLAN OF CARE
[id number]: A&Ox4. VSS on RA. CMS intact. Baseline neuropatthy reported in BUE and BLE. Pain controlled with IV Dilaudid. Hemovac in place and patent. Voiding adequately in urinal. Up with 1, walker and JER. Refused BG to be corrected this evening. PIV infusing. Continue to monitor.

## 2019-02-26 NOTE — PLAN OF CARE
Discharge Planner PT   Patient plan for discharge: Return home with OPPT  Current status: Pt performed bed mobility with min assist and sit to/from stand transfers with CGA.  Gait training x 140 ft using wheeled walker and CGA.  Slow pace.  Good stability without use of KI.  Knee AAROM is 13-65 degrees.  Noted increased pain this AM and elevated -125 bpm throughout session.  Pt c/o feeling warm and sweaty following gait.  Barriers to return to prior living situation: None  Recommendations for discharge: Return home with OPPT per plan established by the PT.   Rationale for recommendations: Pt would benefit from continued PT to improve strength, balance, ROM, mobility to increase independence, reduce falls risk and increase safety before returning home. Pt will be safe returning home with his mother present and OP PT.         Entered by: Teena Klein 02/26/2019 9:51 AM

## 2019-02-26 NOTE — PLAN OF CARE
Patient is A&O, VSS on 2L NC, CMS intact, moderate carb diet, up with assist of 1, and dressing CDI. IV saline locked, Hemovac patent, voiding adequately in the urinal, and PO dilaudid, IV Dilaudid, Robaxin, and Tylenol for pain control. Despite all pain medication, ice and cold packs, patient is still having lots of pain. Will continue to monitor

## 2019-02-26 NOTE — DISCHARGE INSTRUCTIONS
DISCHARGE INSTRUCTIONS AFTER YOUR TOTAL KNEE REPLACEMENT     LAWANDA JUAREZ MD       Instructions to care for your wound at home:   Change the dressing daily.  Inspect your incision at the time of dressing change for increased redness, tenderness, swelling, or drainage along the incision line.  Some bruising or discoloration is usually present, but call my office for any changes in appearance that concern you.  Also call if you develop a fever above 101 degrees.     You may shower directly over the wound beginning 4 days after your surgery, but do not submerge the wound under water until after your post operative visit when the sutures are removed and the wound is completely sealed without drainage.    Activities:  Physical activity may be resumed gradually according to your comfort level. You may bear weight on your operative leg as tolerated with your crutches or walker as instructed by your therapist.  Follow your home exercise program.  Ice your knee after exercising.        Wear your knee immobilizer at night only until your office visit in 2 weeks to maintain full knee extension.  Do not use the immobilizer during the day unless otherwise instructed.      Wear the anti-embolism stockings day and night until seen in the office for your post operative visit. Remove them twice daily for one hour at a time. Keep the compression stockings flat on your leg.  Do not allow them to roll up or crease your skin.  Call if you develop calf pain.     Outpatient Physical Therapy and home exercises:  Outpatient physical therapy visits are required following discharge from the hospital. The referral for these outpatient therapy visits is routinely given to you at the time of your surgical scheduling. You should have already scheduled your therapy sessions in advance.  If you have not done so, please immediately call the therapy site of your choice to schedule the physical therapy regimen that has been prescribed for you.  You  may discontinue the crutches or walker per the therapist's recommendation.      Medications:  New medications for you on discharge will include a pain medication, a stool softener while on the narcotic pain medication, and a blood thinner.  Detailed instructions will come with those medications.  You will also receive instructions on when to resume your home medications.     If you routinely take Aspirin 81 mg, hold the Aspirin while taking the formal blood thinner medication. Then take Aspirin 325 mg (1 tablet) twice daily for 4 weeks.  Then resume your Aspirin 81 mg daily if that is your routine.        Antibiotic coverage will be needed before any type of dental procedure.  This is a life long recommendation.  You should notify your dentist of your total knee surgery and call your dentist or our office one week before a dental appointment for antibiotics.        Clinic follow-up appointment:  Your clinic follow-up appointment has been prearranged.  Call [phone] with any questions.    Nicholas Huff MD

## 2019-02-26 NOTE — PROGRESS NOTES
X cover 0220    Additional dose of 0.5 mg IV Dilaudid once ordered for uncontrolled pain post TKA  - has dilaudid q 1 hrs prn  - suggested nursing to call ortho if continues to have worsening pain post op despite hourly prn dilaudid

## 2019-02-26 NOTE — PLAN OF CARE
Discharge Planner OT   Patient plan for discharge: Home.  Current status: Order rec'd. Met with patient this afternoon and he reports no concern for ADL or bathroom transfers  Barriers to return to prior living situation: None.  Recommendations for discharge: Home with A as needed from family.  Rationale for recommendations: OT eval not indicated as patient declines a need. Order completed.       Entered by: Teena Shankar 02/26/2019 3:50 PM

## 2019-02-26 NOTE — DISCHARGE SUMMARY
DISCHARGE SUMMARY    NAME:  Jeronimo Langston  AGE:  53 year old  YOB: 1965  MRN#:  [id number]    Jeronimo Langston was admitted for elective total knee arthroplasty.  The surgery was performed on 2/25/2019.  The postoperative course is documented in the medical record.  There were no complications. The patient was felt ready for discharge home with post-discharge physical therapy and outpatient visit prearranged.     Lab Results   Component Value Date    HGB 12.4 (L) 02/26/2019    HGB 14.1 02/25/2019    HGB 11.9 (L) 08/17/2016       The patient received 0 units transfusion.      FINAL DISCHARGE DIAGNOSIS:  Degenerative osteoarthritis knee.                    SURGICAL PROCEDURE THIS ADMISSION:   total knee arthroplasty.         LAWANDA JUAREZ MD      CC: Fax [phone]         Lawanda Juarez MD

## 2019-02-27 ENCOUNTER — APPOINTMENT (OUTPATIENT)
Dept: PHYSICAL THERAPY | Facility: CLINIC | Age: 54
End: 2019-02-27
Attending: ORTHOPAEDIC SURGERY
Payer: COMMERCIAL

## 2019-02-27 VITALS
TEMPERATURE: 98.1 F | HEIGHT: 69 IN | RESPIRATION RATE: 18 BRPM | HEART RATE: 129 BPM | OXYGEN SATURATION: 95 % | WEIGHT: 215 LBS | BODY MASS INDEX: 31.84 KG/M2 | DIASTOLIC BLOOD PRESSURE: 87 MMHG | SYSTOLIC BLOOD PRESSURE: 149 MMHG

## 2019-02-27 LAB
GLUCOSE BLDC GLUCOMTR-MCNC: 136 MG/DL (ref 70–99)
GLUCOSE BLDC GLUCOMTR-MCNC: 245 MG/DL (ref 70–99)
HGB BLD-MCNC: 12.7 G/DL (ref 13.3–17.7)

## 2019-02-27 PROCEDURE — 00000146 ZZHCL STATISTIC GLUCOSE BY METER IP

## 2019-02-27 PROCEDURE — 85018 HEMOGLOBIN: CPT | Performed by: ORTHOPAEDIC SURGERY

## 2019-02-27 PROCEDURE — 36415 COLL VENOUS BLD VENIPUNCTURE: CPT | Performed by: ORTHOPAEDIC SURGERY

## 2019-02-27 PROCEDURE — 99231 SBSQ HOSP IP/OBS SF/LOW 25: CPT | Performed by: INTERNAL MEDICINE

## 2019-02-27 PROCEDURE — 25000132 ZZH RX MED GY IP 250 OP 250 PS 637: Performed by: INTERNAL MEDICINE

## 2019-02-27 PROCEDURE — 97110 THERAPEUTIC EXERCISES: CPT | Mod: GP | Performed by: PHYSICAL THERAPY ASSISTANT

## 2019-02-27 PROCEDURE — 25000132 ZZH RX MED GY IP 250 OP 250 PS 637: Performed by: NURSE PRACTITIONER

## 2019-02-27 PROCEDURE — 97116 GAIT TRAINING THERAPY: CPT | Mod: GP | Performed by: PHYSICAL THERAPY ASSISTANT

## 2019-02-27 PROCEDURE — 25000128 H RX IP 250 OP 636: Performed by: ORTHOPAEDIC SURGERY

## 2019-02-27 PROCEDURE — 25000132 ZZH RX MED GY IP 250 OP 250 PS 637: Performed by: ORTHOPAEDIC SURGERY

## 2019-02-27 RX ORDER — GLIPIZIDE AND METFORMIN HCL 5; 500 MG/1; MG/1
1 TABLET, FILM COATED ORAL
Status: DISCONTINUED | OUTPATIENT
Start: 2019-02-27 | End: 2019-02-27

## 2019-02-27 RX ORDER — GLIPIZIDE 5 MG/1
5 TABLET ORAL
Status: DISCONTINUED | OUTPATIENT
Start: 2019-02-27 | End: 2019-02-27 | Stop reason: HOSPADM

## 2019-02-27 RX ADMIN — SENNOSIDES AND DOCUSATE SODIUM 2 TABLET: 8.6; 5 TABLET ORAL at 07:50

## 2019-02-27 RX ADMIN — HYDROMORPHONE HYDROCHLORIDE 4 MG: 2 TABLET ORAL at 04:40

## 2019-02-27 RX ADMIN — GABAPENTIN 600 MG: 600 TABLET, FILM COATED ORAL at 07:50

## 2019-02-27 RX ADMIN — HYDROMORPHONE HYDROCHLORIDE 4 MG: 2 TABLET ORAL at 01:21

## 2019-02-27 RX ADMIN — HYDROMORPHONE HYDROCHLORIDE 4 MG: 2 TABLET ORAL at 08:32

## 2019-02-27 RX ADMIN — METFORMIN HYDROCHLORIDE 500 MG: 500 TABLET, FILM COATED ORAL at 07:50

## 2019-02-27 RX ADMIN — LORAZEPAM 0.5 MG: 0.5 TABLET ORAL at 07:51

## 2019-02-27 RX ADMIN — ACETAMINOPHEN 975 MG: 325 TABLET, FILM COATED ORAL at 06:09

## 2019-02-27 RX ADMIN — ONDANSETRON 4 MG: 2 INJECTION INTRAMUSCULAR; INTRAVENOUS at 07:41

## 2019-02-27 RX ADMIN — ACETAMINOPHEN 975 MG: 325 TABLET, FILM COATED ORAL at 13:21

## 2019-02-27 RX ADMIN — GABAPENTIN 600 MG: 600 TABLET, FILM COATED ORAL at 13:21

## 2019-02-27 RX ADMIN — HYDROMORPHONE HYDROCHLORIDE 4 MG: 2 TABLET ORAL at 11:33

## 2019-02-27 RX ADMIN — ENOXAPARIN SODIUM 40 MG: 40 INJECTION SUBCUTANEOUS at 08:32

## 2019-02-27 ASSESSMENT — ACTIVITIES OF DAILY LIVING (ADL)
ADLS_ACUITY_SCORE: 13

## 2019-02-27 NOTE — PLAN OF CARE
AOx4. VSS ex HTN/tachy. Mod CHO diet. A1+walker, voiding in urinal. C/o LLE pain - managed w/ PRN dilaudid, scheduled tylenol, cold packs. Dressing CDI, CMS intact. /136 - refusing insulin, MD aware. R PIV SL. Discharge pending.

## 2019-02-27 NOTE — PLAN OF CARE
Alert and oriented X 4, up with SBA with WW/GB. Pain controlled with oral dilaudid/APAP. Voiding adequately. Nauseous this AM with episode of diaphoresis, shakiness; improved with IV zofran, oral Ativan. Now tolerating regular diet. States feels back at BL. , declined sliding scale insulin. Metformin/glucophage restarted. Cleared to discharge home per surgeon/hospitalist. Writer reviewed all discharge instructions with pt, including new medications, dressing changes, self-administering lovenox, follow up appt, and when to notify MD. Pt acknowledged understanding; all questions answered. Sister transporting home.

## 2019-02-27 NOTE — PROGRESS NOTES
[facility]  Hospitalist Progress Note  Karina Sutton MD    Assessment & Plan   Mr. 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 Nicholas Huff.  Hospitalist Service is consulted to assist with management of medical comorbid conditions in the postoperative period.      Left knee OA, status post left total knee arthroplasty on 02/25/2019.  Stable.  -- Continue management per ortho  -- PT/OT    Acute blood loss anemia.  Mild and due to knee surgery.  -- Monitor periodically    Recent Labs   Lab 02/27/19  0642 02/26/19  0638 02/25/19  0604   HGB 12.7* 12.4* 14.1        DM2,  Peripheral diabetic neuropathy.  Well controlled with A1c of 6.3% on 02/20/2019.  He has significant neuropathy and takes Neurontin 4 times a day.     -- Continue SSI  -- Resumed PTA metformin 500 mg  po bid   -- Continue moderate carbohydrate diet ( patient agreed to take insulin prn in the hospital)  -- Resume PTA glipizide at the time of discharge.   -- Continue PTA Neurontin     Anxiety.   Depression.   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    Left eye is prosthetic and the pupil does not react.      History of DVT, post trauma.   -- Enoxaparin is ordered for prophylaxis starting on 02/26/2019.   -- The patient also has a PCDs and VIKTORIA hose on the right lower extremity.        Diastolic hypertension with few readings in the 80s.   -- Suspect this is related to pain.   -- PRN labetalol available.        Orders Placed This Encounter      Moderate Consistent CHO Diet    DVT Prophylaxis: Lovenox  Code Status: Full code  Disposition: Anticipate discharge to home today per ortho input. Patient is stable medically for discharge.    Interval History    Patient reports feeling sweaty and hot but temp only 98.1. Surgical pain is controlled. He reports no chest pain, sob, or other complaints.    Data reviewed today: I reviewed all new labs and imaging over the last 24 hours. I personally reviewed no images or EKG's today.    Physical Exam   Temp: 98.1  F (36.7  C) Temp src: Oral BP: 149/87 Pulse: 129 Heart Rate: 117 Resp: 16 SpO2: 95 % O2 Device: None (Room air)    Vitals:    02/25/19 0608   Weight: 97.5 kg (215 lb)     Vital Signs with Ranges  Temp:  [98  F (36.7  C)-98.4  F (36.9  C)] 98.1  F (36.7  C)  Pulse:  [120-129] 129  Heart Rate:  [108-132] 117  Resp:  [15-16] 16  BP: (136-154)/(87-97) 149/87  SpO2:  [93 %-95 %] 95 %  I/O's Last 24 hours  I/O last 3 completed shifts:  In: 200 [P.O.:200]  Out: 4075 [Urine:4075]    Constitutional: Comfortable, no acute distress  HEENT: Mild conjunctival pallor.  Neurologic: Awake, alert, fully oriented  Neck: Supple, no elevated JVP  Respiratory: Clear to auscultation  Cardiovascular: Normal S1 and S2, regular rhythm and rate  GI: Abdomen soft, non tender, non distended  Extremities: No calf tenderness, mild left knee swelling  Skin/integument: No acute rash, no cyanosis    Medications   All medications were reviewed.      acetaminophen  975 mg Oral Q8H     DULoxetine  60 mg Oral QPM     enoxaparin  40 mg Subcutaneous Q24H     gabapentin  600 mg Oral 4x Daily     insulin aspart  1-7 Units Subcutaneous TID AC     insulin aspart  1-5 Units Subcutaneous At Bedtime     metFORMIN  500 mg Oral BID w/meals     senna-docusate  1 tablet Oral BID    Or     senna-docusate  2 tablet Oral BID     sodium chloride (PF)  3 mL Intracatheter Q8H        Data   Recent Labs   Lab 02/27/19  0642 02/26/19  0638 02/25/19  1316 02/25/19  0604   HGB 12.7* 12.4*  --  14.1   PLT  --  181 177  --    NA  --  139  --  139   POTASSIUM  --  4.2  --  4.6   CHLORIDE  --  106  --  108   CO2  --  27  --  26   BUN  --  11  --  21   CR  --  0.76 0.81 0.71    ANIONGAP  --  6  --  5   ZOIE  --  8.7  --  8.8   GLC  --  202*  --  156*       No results found for this or any previous visit (from the past 24 hour(s)).

## 2019-02-27 NOTE — PLAN OF CARE
"A&O x 4, BP and HR elevated, sweating, C/O headache and mild tremors noted in hands, writer questioned drinking habits. Pt stated \" I drink water all day, lots of water, nothing else. \" later stated last drink was Saturday. Was given a  dose of ativan which helped stated symptoms,pain controlled with oral dilaudid and robaxin, dressing changed and hemovac pulled, up with assist of 1 walker, voiding adequately in urinal, IV SL, pt refuses to take insulin states \" I dont want to start that stuff\", did take metformin,  continue to monitor.    "

## 2019-02-27 NOTE — PLAN OF CARE
Discharge Planner PT   Patient plan for discharge: Return home with OPPT  Current status: Pt HR was taken prior to exercises at 112-118 bpm and O2 sats were between 94-96%, during exercises HR increased to 120-128 bpm. Pt performed bed mobility of min assist with LE for supine to sit on edge of table, supervision for sit to supine, sit to and from stand was CGA. Pt performed gait training of 150 feet using wheeled walker. Good stability without KI. Pace was slow and stable with step through pattern. Pt ascended and descended 3 steps with proper step to technique and two rails CGA. Knee AAROM was 8-72 degrees. Mild dizziness during session.  Barriers to return to prior living situation: None  Recommendations for discharge: Return home with OPPT per plan established by the PT.  Rationale for recommendations: Pt would benefit from continued PT to improve strength, balance, ROM, mobility to increase independence, reduce falls risk and increase safety before returning home. Pt will be safe returning home with his mother present and OP PT.           Entered by: Hayley Zhao 02/27/2019 9:44 AM

## 2019-02-27 NOTE — PLAN OF CARE
Pt declined PM PT appointment due to discharging.     Pt is discharging home with OP PT and assist of mother.  PT goals not met.     Physical Therapy Discharge Summary    Reason for therapy discharge:    Discharged to home with outpatient therapy.    Progress towards therapy goal(s). See goals on Care Plan in [facility] electronic health record for goal details.  Goals not met.  Barriers to achieving goals:   discharge from facility.    Therapy recommendation(s):    Continued therapy is recommended.  Rationale/Recommendations:  OPPT to progress mobility.  Continue home exercise program.

## (undated) DEVICE — GLOVE PROTEXIS POWDER FREE 8.0 ORTHOPEDIC 2D73ET80

## (undated) DEVICE — ESU GROUND PAD UNIVERSAL W/O CORD

## (undated) DEVICE — WRAP EZY KNEE

## (undated) DEVICE — IMM KNEE 24" 0814-2664

## (undated) DEVICE — CAST PADDING 6" UNSTERILE 9046

## (undated) DEVICE — MANIFOLD NEPTUNE 4 PORT 700-20

## (undated) DEVICE — SUCTION IRR SYSTEM W/O TIP INTERPULSE HANDPIECE 0210-100-000

## (undated) DEVICE — BLADE SAW SAGITTAL STRK 29X83.5X1.27MM 4/2000 2108-183-000

## (undated) DEVICE — PREP CHLORAPREP 26ML TINTED ORANGE  260815

## (undated) DEVICE — BONE CLEANING TIP INTERPULSE  0210-010-000

## (undated) DEVICE — BLADE CLIPPER SGL USE 9680

## (undated) DEVICE — NDL SPINAL 18GA 3.5" 405184

## (undated) DEVICE — PACK TOTAL KNEE SOP15TKFSD

## (undated) DEVICE — GOWN IMPERVIOUS SPECIALTY XL/XLONG 39049

## (undated) DEVICE — TOURNIQUET CUFF 30" STERILE

## (undated) DEVICE — SYR 30ML LL W/O NDL 302832

## (undated) DEVICE — BONE CEMENT MIXEVAC III HI VAC KIT  0206-015-000

## (undated) DEVICE — SU STRATAFIX PDS PLUS 0 CT 45CM SXPP1A406

## (undated) DEVICE — SOL WATER IRRIG 1000ML BOTTLE 2F7114

## (undated) DEVICE — SU STRATAFIX PDS PLUS 1 CT-1 18" SXPP1A404

## (undated) DEVICE — ESU PENCIL W/HOLSTER E2350H

## (undated) DEVICE — BLADE SAW SAGITTAL STRK 21X90X1.27MM HD SYS 6 6221-127-090

## (undated) DEVICE — GLOVE PROTEXIS W/NEU-THERA 8.0  2D73TE80

## (undated) DEVICE — LINEN TOWEL PACK X5 5464

## (undated) DEVICE — GLOVE PROTEXIS POWDER FREE 8.5 ORTHOPEDIC 2D73ET85

## (undated) DEVICE — SU VICRYL 2-0 CP-1 27" UND J266H

## (undated) DEVICE — DRSG XEROFORM 5X9" 8884431605

## (undated) DEVICE — SU ETHIBOND 0 CTX CR  8X18" CX31D

## (undated) DEVICE — HOOD FLYTE W/PEELAWAY 408-800-100

## (undated) DEVICE — DRAIN ROUND W/RESERV KIT JACKSON PRATT 10FR 400ML SU130-402D

## (undated) RX ORDER — ONDANSETRON 2 MG/ML
INJECTION INTRAMUSCULAR; INTRAVENOUS
Status: DISPENSED
Start: 2019-02-25

## (undated) RX ORDER — DEXAMETHASONE SODIUM PHOSPHATE 4 MG/ML
INJECTION, SOLUTION INTRA-ARTICULAR; INTRALESIONAL; INTRAMUSCULAR; INTRAVENOUS; SOFT TISSUE
Status: DISPENSED
Start: 2019-02-25

## (undated) RX ORDER — NEOSTIGMINE METHYLSULFATE 1 MG/ML
VIAL (ML) INJECTION
Status: DISPENSED
Start: 2019-02-25

## (undated) RX ORDER — CEFAZOLIN SODIUM 2 G/100ML
INJECTION, SOLUTION INTRAVENOUS
Status: DISPENSED
Start: 2019-02-25

## (undated) RX ORDER — FENTANYL CITRATE 50 UG/ML
INJECTION, SOLUTION INTRAMUSCULAR; INTRAVENOUS
Status: DISPENSED
Start: 2019-02-25

## (undated) RX ORDER — GABAPENTIN 300 MG/1
CAPSULE ORAL
Status: DISPENSED
Start: 2019-02-25

## (undated) RX ORDER — ACYCLOVIR 200 MG/1
CAPSULE ORAL
Status: DISPENSED
Start: 2019-02-25

## (undated) RX ORDER — HYDROMORPHONE HYDROCHLORIDE 1 MG/ML
INJECTION, SOLUTION INTRAMUSCULAR; INTRAVENOUS; SUBCUTANEOUS
Status: DISPENSED
Start: 2019-02-25

## (undated) RX ORDER — KETOROLAC TROMETHAMINE 30 MG/ML
INJECTION, SOLUTION INTRAMUSCULAR; INTRAVENOUS
Status: DISPENSED
Start: 2019-02-25

## (undated) RX ORDER — PROPOFOL 10 MG/ML
INJECTION, EMULSION INTRAVENOUS
Status: DISPENSED
Start: 2019-02-25

## (undated) RX ORDER — KETAMINE HCL IN NACL, ISO-OSM 100MG/10ML
SYRINGE (ML) INJECTION
Status: DISPENSED
Start: 2019-02-25

## (undated) RX ORDER — GLYCOPYRROLATE 0.2 MG/ML
INJECTION, SOLUTION INTRAMUSCULAR; INTRAVENOUS
Status: DISPENSED
Start: 2019-02-25

## (undated) RX ORDER — LIDOCAINE HYDROCHLORIDE 20 MG/ML
INJECTION, SOLUTION EPIDURAL; INFILTRATION; INTRACAUDAL; PERINEURAL
Status: DISPENSED
Start: 2019-02-25

## (undated) RX ORDER — ROPIVACAINE HYDROCHLORIDE 2 MG/ML
INJECTION, SOLUTION EPIDURAL; INFILTRATION; PERINEURAL
Status: DISPENSED
Start: 2019-02-25

## (undated) RX ORDER — ACETAMINOPHEN 325 MG/1
TABLET ORAL
Status: DISPENSED
Start: 2019-02-25

## (undated) RX ORDER — ALBUTEROL SULFATE 90 UG/1
AEROSOL, METERED RESPIRATORY (INHALATION)
Status: DISPENSED
Start: 2019-02-25